# Patient Record
Sex: FEMALE | Race: WHITE
[De-identification: names, ages, dates, MRNs, and addresses within clinical notes are randomized per-mention and may not be internally consistent; named-entity substitution may affect disease eponyms.]

---

## 2018-07-31 ENCOUNTER — HOSPITAL ENCOUNTER (EMERGENCY)
Dept: HOSPITAL 97 - ER | Age: 53
Discharge: HOME | End: 2018-07-31
Payer: COMMERCIAL

## 2018-07-31 VITALS — OXYGEN SATURATION: 97 % | DIASTOLIC BLOOD PRESSURE: 81 MMHG | SYSTOLIC BLOOD PRESSURE: 118 MMHG

## 2018-07-31 VITALS — TEMPERATURE: 97 F

## 2018-07-31 DIAGNOSIS — R06.02: Primary | ICD-10-CM

## 2018-07-31 DIAGNOSIS — F17.210: ICD-10-CM

## 2018-07-31 LAB
ALBUMIN SERPL BCP-MCNC: 3.7 G/DL (ref 3.4–5)
ALP SERPL-CCNC: 95 U/L (ref 45–117)
ALT SERPL W P-5'-P-CCNC: 51 U/L (ref 12–78)
AST SERPL W P-5'-P-CCNC: 24 U/L (ref 15–37)
BUN BLD-MCNC: 20 MG/DL (ref 7–18)
GLUCOSE SERPLBLD-MCNC: 87 MG/DL (ref 74–106)
HCT VFR BLD CALC: 41.8 % (ref 36–45)
INR BLD: 0.85
LYMPHOCYTES # SPEC AUTO: 3.1 K/UL (ref 0.7–4.9)
MAGNESIUM SERPL-MCNC: 2.1 MG/DL (ref 1.8–2.4)
MCH RBC QN AUTO: 29.3 PG (ref 27–35)
MCV RBC: 87.4 FL (ref 80–100)
NT-PROBNP SERPL-MCNC: 55 PG/ML (ref ?–125)
PMV BLD: 8.1 FL (ref 7.6–11.3)
POTASSIUM SERPL-SCNC: 3.6 MMOL/L (ref 3.5–5.1)
RBC # BLD: 4.78 M/UL (ref 3.86–4.86)
UA COMPLETE W REFLEX CULTURE PNL UR: (no result)

## 2018-07-31 PROCEDURE — 36415 COLL VENOUS BLD VENIPUNCTURE: CPT

## 2018-07-31 PROCEDURE — 84484 ASSAY OF TROPONIN QUANT: CPT

## 2018-07-31 PROCEDURE — 83735 ASSAY OF MAGNESIUM: CPT

## 2018-07-31 PROCEDURE — 81025 URINE PREGNANCY TEST: CPT

## 2018-07-31 PROCEDURE — 93005 ELECTROCARDIOGRAM TRACING: CPT

## 2018-07-31 PROCEDURE — 80048 BASIC METABOLIC PNL TOTAL CA: CPT

## 2018-07-31 PROCEDURE — 87086 URINE CULTURE/COLONY COUNT: CPT

## 2018-07-31 PROCEDURE — 85610 PROTHROMBIN TIME: CPT

## 2018-07-31 PROCEDURE — 83880 ASSAY OF NATRIURETIC PEPTIDE: CPT

## 2018-07-31 PROCEDURE — 81003 URINALYSIS AUTO W/O SCOPE: CPT

## 2018-07-31 PROCEDURE — 80076 HEPATIC FUNCTION PANEL: CPT

## 2018-07-31 PROCEDURE — 99284 EMERGENCY DEPT VISIT MOD MDM: CPT

## 2018-07-31 PROCEDURE — 71046 X-RAY EXAM CHEST 2 VIEWS: CPT

## 2018-07-31 PROCEDURE — 81015 MICROSCOPIC EXAM OF URINE: CPT

## 2018-07-31 PROCEDURE — 85025 COMPLETE CBC W/AUTO DIFF WBC: CPT

## 2018-07-31 PROCEDURE — 87088 URINE BACTERIA CULTURE: CPT

## 2018-07-31 NOTE — RAD REPORT
EXAM DESCRIPTION:  RAD - Chest Pa And Lat (2 Views) - 7/31/2018 9:55 pm

 

CLINICAL HISTORY:  Shortness of breath, inhalation injury

 

COMPARISON:  November 2010

 

TECHNIQUE:  PA and lateral views of the chest were obtained.

 

FINDINGS:  The lungs are clear.  Lung markings are mildly prominent as a baseline. Heart size is norm
al and central vasculature is within normal limits.  No pleural effusion or pneumothorax seen.  No ac
[place] bony finding noted.  No aortic abnormality.

 

IMPRESSION:  No acute cardiopulmonary process.  No significant change from comparison.

## 2018-07-31 NOTE — EDPHYS
Physician Documentation                                                                           

 [facility]                                                                

Name: Emma Pizarro                                                                                 

Age: 53 yrs                                                                                       

Sex: Female                                                                                       

: 1965                                                                                   

MRN: [id number]                                                                                   

Arrival Date: 2018                                                                          

Time: 19:05                                                                                       

Account#: [id number]                                                                            

Bed 19                                                                                            

Private MD: Eddie Ferrera T                                                                        

ED Physician Shant Moyer                                                                      

HPI:                                                                                              

                                                                                             

20:05 This 53 yrs old  Female presents to ER via Ambulatory with complaints of Lung  wa  

      Pain.                                                                                       

20:05 The patient has shortness of breath at rest, with light activity, states inhaled strong wa  

      household cleaning agent a week ago and has been experiencing burning in chest with         

      breathing. today feels weak. feels like cannot get enough air. c/o needing the prop         

      head up to sleep x several months as laying flat feels like something sitting on chest.     

      also c/o dyspnea with minimal exertion but denies pain with exertion. denies cough,         

      fever, or dizziness. denies chest pain at the moment. denies leg swelling or pain.          

      Onset: The symptoms/episode began/occurred 1 week(s) ago. Duration: The symptoms are        

      continuous, and are steadily getting worse. The patient's shortness of breath is            

      aggravated by exertion, is alleviated by rest. Associated signs and symptoms: Pertinent     

      positives: Pertinent negatives: non-productive cough, productive cough, diaphoresis,        

      dizziness, fever, vomiting. Severity of symptoms: At their worst the symptoms were          

      moderate in the emergency department the symptoms are unchanged. The patient has not        

      experienced similar symptoms in the past. The patient has not recently seen a               

      physician. see above.                                                                       

                                                                                                  

OB/GYN:                                                                                           

19:23 LMP N/A - Hysterectomy                                                                  aj  

                                                                                                  

Historical:                                                                                       

- Allergies:                                                                                      

19:23 No Known Allergies;                                                                     aj  

- Home Meds:                                                                                      

19:23 Seroquel Oral [Active];                                                                 aj  

- PMHx:                                                                                           

19:23 None;                                                                                   aj  

- PSHx:                                                                                           

19:23 Hysterectomy;                                                                           aj  

                                                                                                  

- Immunization history:: Adult Immunizations up to date.                                          

- Social history:: Smoking status: Patient uses tobacco products, smokes one-half pack            

  cigarettes per day.                                                                             

- Ebola Screening: : Patient negative for fever greater than or equal to 101.5 degrees            

  Fahrenheit, and additional compatible Ebola Virus Disease symptoms Patient denies               

  exposure to infectious person Patient denies travel to an Ebola-affected area in the            

  21 days before illness onset No symptoms or risks identified at this time.                      

                                                                                                  

                                                                                                  

ROS:                                                                                              

20:08 Constitutional: Negative for fever, chills, and weight loss, Eyes: Negative for injury, wa  

      pain, redness, and discharge, ENT: Negative for injury, pain, and discharge, Neck:          

      Negative for injury, pain, and swelling, Abdomen/GI: Negative for abdominal pain,           

      nausea, vomiting, diarrhea, and constipation, Back: Negative for injury and pain, :       

      Negative for injury, bleeding, discharge, and swelling, MS/Extremity: Negative for          

      injury and deformity, Skin: Negative for injury, rash, and discoloration, Neuro:            

      Negative for headache, weakness, numbness, tingling, and seizure, Psych: Negative for       

      depression, anxiety, suicide ideation, homicidal ideation, and hallucinations.              

20:08 Cardiovascular: Positive for orthopnea, paroxysmal nocturnal dyspnea, Negative for          

      chest pain, edema, palpitations.                                                            

20:08 Respiratory: Positive for dyspnea on exertion, shortness of breath, Negative for cough,     

      sputum production, wheezing.                                                                

                                                                                                  

Exam:                                                                                             

20:09 Constitutional:  This is a well developed, well nourished patient who is awake, alert,  wa  

      and in no acute distress. Head/Face:  Normocephalic, atraumatic. Eyes:  Pupils equal        

      round and reactive to light, extra-ocular motions intact.  Lids and lashes normal.          

      Conjunctiva and sclera are non-icteric and not injected.  Cornea within normal limits.      

      Periorbital areas with no swelling, redness, or edema. ENT:  Nares patent. No nasal         

      discharge, no septal abnormalities noted.  Tympanic membranes are normal and external       

      auditory canals are clear.  Oropharynx with no redness, swelling, or masses, exudates,      

      or evidence of obstruction, uvula midline.  Mucous membranes moist. Neck:  Trachea          

      midline, no thyromegaly or masses palpated, and no cervical lymphadenopathy.  Supple,       

      full range of motion without nuchal rigidity, or vertebral point tenderness.  No            

      Meningismus. Chest/axilla:  Normal chest wall appearance and motion.  Nontender with no     

      deformity.  No lesions are appreciated. Cardiovascular:  Regular rate and rhythm with a     

      normal S1 and S2.  No gallops, murmurs, or rubs.  Normal PMI, no JVD.  No pulse             

      deficits. Respiratory:  Lungs have equal breath sounds bilaterally, clear to                

      auscultation and percussion.  No rales, rhonchi or wheezes noted.  No increased work of     

      breathing, no retractions or nasal flaring. Abdomen/GI:  Soft, non-tender, with normal      

      bowel sounds.  No distension or tympany.  No guarding or rebound.  No evidence of           

      tenderness throughout. Back:  No spinal tenderness.  No costovertebral tenderness.          

      Full range of motion. Skin:  Warm, dry with normal turgor.  Normal color with no            

      rashes, no lesions, and no evidence of cellulitis. MS/ Extremity:  Pulses equal, no         

      cyanosis.  Neurovascular intact.  Full, normal range of motion. Neuro:  Awake and           

      alert, GCS 15, oriented to person, place, time, and situation.  Cranial nerves II-XII       

      grossly intact.  Motor strength 5/5 in all extremities.  Sensory grossly intact.            

      Cerebellar exam normal.  Normal gait. Psych:  Awake, alert, with orientation to person,     

      place and time.  Behavior, mood, and affect are within normal limits.                       

                                                                                                  

Vital Signs:                                                                                      

19:23  / 72; Pulse 85; Resp 20; Temp 97.0; Pulse Ox 98% on R/A; Weight 101.6 kg; Height aj  

      5 ft. 1 in. (154.94 cm);                                                                    

20:45  / 68; Pulse 68; Resp 18; Pulse Ox 98% on R/A;                                    tl2 

21:34  / 72; Pulse 70; Resp 18; Pulse Ox 98% on R/A;                                    tl2 

22:57  / 81; Pulse 72; Resp 18; Pulse Ox 97% on R/A;                                    tl2 

19:23 Body Mass Index 42.32 (101.60 kg, 154.94 cm)                                              

                                                                                                  

MDM:                                                                                              

19:35 Patient medically screened.                                                             wa  

20:09 Differential diagnosis: obese, female smoker with symptoms of DEBORAH. will eval to r/o     wa  

      acute cardiopulm etiology. will have f/u with cardiology for r/o ACS if work up             

      negative in ED.                                                                             

23:23 Data reviewed: vital signs, nurses notes, lab test result(s), EKG, radiologic studies.  wa  

      Test interpretation: by ED physician or midlevel provider: EKG: HR 70. nml axis. no         

      acute ischemic abnml noted.                                                                 

23:24 Test interpretation: by ED physician or midlevel provider: labs noted within nml        wa  

      limits. nml CXR.                                                                            

                                                                                                  

                                                                                             

19:45 Order name: Magnesium; Complete Time: 21:18                                             wa  

                                                                                             

19:45 Order name: Basic Metabolic Panel; Complete Time: 21:18                                 wa  

                                                                                             

19:45 Order name: CBC with Diff; Complete Time: 21:18                                         wa  

                                                                                             

19:45 Order name: LFT's; Complete Time: 21:18                                                 wa  

                                                                                             

19:45 Order name: NT PRO-BNP; Complete Time: 21:18                                            wa  

                                                                                             

19:45 Order name: PT-INR; Complete Time: 21:18                                                wa  

                                                                                             

19:45 Order name: Troponin (emerg Dept Use Only); Complete Time: 21:18                        wa  

                                                                                             

19:45 Order name: EKG; Complete Time: 20:15                                                   wa  

                                                                                             

19:45 Order name: Cardiac monitoring; Complete Time: 20:04                                    wa  

                                                                                             

19:45 Order name: Urine Microscopic Only; Complete Time: 21:18                                wa  

                                                                                             

20:43 Order name: Urine Dipstick--Ancillary (enter results); Complete Time: 21:18             [facility] 

                                                                                             

20:43 Order name: Urine Pregnancy--Ancillary (enter results); Complete Time: 21:18            [facility] 

                                                                                             

21:05 Order name: Urine Culture                                                               [facility]

                                                                                             

21:31 Order name: Chest Pa And Lat (2 Views) XRAY; Complete Time: 23:08                       wa  

                                                                                             

19:45 Order name: EKG - Nurse/Tech; Complete Time: 20:40                                      wa  

                                                                                             

19:45 Order name: IV Saline Lock; Complete Time: 20:03                                        wa  

                                                                                             

19:45 Order name: Labs collected and sent; Complete Time: 20:03                               wa  

                                                                                             

19:45 Order name: O2 Per Protocol; Complete Time: 20:03                                       wa  

                                                                                             

19:45 Order name: O2 Sat Monitoring; Complete Time: 20:03                                     wa  

                                                                                             

19:45 Order name: Urine Dipstick-Ancillary (obtain specimen); Complete Time: 20:40            wa  

                                                                                                  

Administered Medications:                                                                         

No medications were administered                                                                  

                                                                                                  

                                                                                                  

Disposition:                                                                                      

18 23:25 Discharged to Home. Impression: shortness of breath.                               

- Condition is Stable.                                                                            

- Discharge Instructions: Shortness of Breath, Easy-to-Read.                                      

- Prescriptions for Albuterol Sulfate 90 mcg/actuation - inhale 1-2 puff by INHALATION            

  route every 4-6 hours; 1 Inhaler.                                                               

- Medication Reconciliation Form, Thank You Letter, Antibiotic Education, Prescription            

  Opioid Use form.                                                                                

- Follow up: Tung Finch MD; When: 1 - 2 days; Reason: Recheck today's complaints.            

- Notes: quit smoking. follow up with the cardiologist for further evaluation and                 

  possible stress testing. retutn to ER immediately for worsening shortness of breath             

  and or if you develop chest pain related to exertion                                            

                                                                                                  

                                                                                                  

Signatures:                                                                                       

Dispatcher [employer]                           EDDiana Conroy RN RN   aj                                                   

Amy Guillen RN                        RN   tl2                                                  

Shant Moyer MD MD wa                                                   

                                                                                                  

Corrections: (The following items were deleted from the chart)                                    

23:40 23:25 2018 23:25 Discharged to Home. Impression: shortness of breath. Condition   tl2 

      is Stable. Forms are Medication Reconciliation Form, Thank You Letter, Antibiotic           

      Education, Prescription Opioid Use. Follow up: Tung Finch; When: 1 - 2 days; Reason:     

      Recheck today's complaints. wa                                                              

                                                                                                  

**************************************************************************************************

## 2018-07-31 NOTE — ER
Nurse's Notes                                                                                     

 [facility]                                                                

Name: Emma Pizarro                                                                                 

Age: 53 yrs                                                                                       

Sex: Female                                                                                       

: 1965                                                                                   

MRN: [id number]                                                                                   

Arrival Date: 2018                                                                          

Time: 19:05                                                                                       

Account#: [id number]                                                                            

Bed 19                                                                                            

Private MD: Eddie Ferrera T                                                                        

Diagnosis: shortness of breath                                                                    

                                                                                                  

Presentation:                                                                                     

                                                                                             

19:22 Presenting complaint: Patient states: Reports SOB and sore throat that started 1 week   aj  

      ago. Patient reports that she is worried she may have inhaled bathroom clearer fumes 1      

      week ago. Presented with 2 drinks to triage with steady gait. Voice is clear.               

      Transition of care: patient was not received from another setting of care. Onset of         

      symptoms was 2018. Risk Assessment: Do you want to hurt yourself or someone        

      else? Patient reports no desire to harm self or others. Initial Sepsis Screen: Does the     

      patient meet any 2 criteria? No. Patient's initial sepsis screen is negative. Does the      

      patient have a suspected source of infection? No. Patient's initial sepsis screen is        

      negative. Care prior to arrival: None.                                                      

19:22 Method Of Arrival: Ambulatory                                                             

19:22 Acuity: REZA 4                                                                           aj  

                                                                                                  

Triage Assessment:                                                                                

19:23 General: Appears in no apparent distress. comfortable, Behavior is calm, cooperative,   aj  

      appropriate for age. Pain: Denies pain. Neuro: Level of Consciousness is awake, alert,      

      obeys commands, Oriented to person, place, time, situation, Appropriate for age.            

      Respiratory: Reports shortness of breath Airway is patent Respiratory effort is even,       

      unlabored, Respiratory pattern is regular, symmetrical. Derm: Skin is intact, is            

      healthy with good turgor, Skin is pink, warm \T\ dry. normal.                               

                                                                                                  

OB/GYN:                                                                                           

19:23 LMP N/A - Hysterectomy                                                                  aj  

                                                                                                  

Historical:                                                                                       

- Allergies:                                                                                      

19:23 No Known Allergies;                                                                     aj  

- Home Meds:                                                                                      

19:23 Seroquel Oral [Active];                                                                 aj  

- PMHx:                                                                                           

19:23 None;                                                                                   aj  

- PSHx:                                                                                           

19:23 Hysterectomy;                                                                           aj  

                                                                                                  

- Immunization history:: Adult Immunizations up to date.                                          

- Social history:: Smoking status: Patient uses tobacco products, smokes one-half pack            

  cigarettes per day.                                                                             

- Ebola Screening: : Patient negative for fever greater than or equal to 101.5 degrees            

  Fahrenheit, and additional compatible Ebola Virus Disease symptoms Patient denies               

  exposure to infectious person Patient denies travel to an Ebola-affected area in the            

  21 days before illness onset No symptoms or risks identified at this time.                      

                                                                                                  

                                                                                                  

Screenin:41 Abuse screen: Denies threats or abuse. Nutritional screening: No deficits noted.        tl2 

      Tuberculosis screening: No symptoms or risk factors identified. Fall Risk None              

      identified.                                                                                 

                                                                                                  

Assessment:                                                                                       

19:30 General: Appears in no apparent distress. comfortable, Behavior is calm, cooperative,   tl2 

      appropriate for age. General: Pt reports cleaning with [employer] 1 week ago and      

      she feels like she may have chemical pneumonia. Denies pain or coughing, states "It         

      just feels weird when I breathe". Pain: Denies pain. Neuro: Level of Consciousness is       

      awake, alert, obeys commands, Oriented to person, place, time, situation.                   

      Cardiovascular: Denies chest pain, Heart tones S1 S2 present. Respiratory: Reports          

      shortness of breath Airway is patent Respiratory effort is even, unlabored, Respiratory     

      pattern is regular, symmetrical, Breath sounds are clear bilaterally. Denies cough,         

      labored breathing. GI: No signs and/or symptoms were reported involving the                 

      gastrointestinal system. : No signs and/or symptoms were reported regarding the           

      genitourinary system. Derm: Skin is pink, warm \T\ dry.                                     

20:30 Reassessment: Patient appears in no apparent distress at this time. Patient and/or      tl2 

      family updated on plan of care and expected duration. Pain level reassessed. Patient is     

      alert, oriented x 3, equal unlabored respirations, skin warm/dry/pink.                      

21:34 Reassessment: Patient appears in no apparent distress at this time. Patient and/or      tl2 

      family updated on plan of care and expected duration. Pain level reassessed. Patient is     

      alert, oriented x 3, equal unlabored respirations, skin warm/dry/pink. awaiting lab         

      results.                                                                                    

22:57 Reassessment: Patient appears in no apparent distress at this time. Patient and/or      tl2 

      family updated on plan of care and expected duration. Pain level reassessed. Patient is     

      alert, oriented x 3, equal unlabored respirations, skin warm/dry/pink. Pending dispo.       

23:38 Reassessment: Patient appears in no apparent distress at this time. Patient and/or      tl2 

      family updated on plan of care and expected duration. Pain level reassessed. Patient is     

      alert, oriented x 3, equal unlabored respirations, skin warm/dry/pink. pt verbalized        

      understanding of discharge instructions, need for follow up and prescription usage.         

                                                                                                  

Vital Signs:                                                                                      

19:23  / 72; Pulse 85; Resp 20; Temp 97.0; Pulse Ox 98% on R/A; Weight 101.6 kg; Height aj  

      5 ft. 1 in. (154.94 cm);                                                                    

20:45  / 68; Pulse 68; Resp 18; Pulse Ox 98% on R/A;                                    tl2 

21:34  / 72; Pulse 70; Resp 18; Pulse Ox 98% on R/A;                                    tl2 

22:57  / 81; Pulse 72; Resp 18; Pulse Ox 97% on R/A;                                    tl2 

19:23 Body Mass Index 42.32 (101.60 kg, 154.94 cm)                                              

                                                                                                  

ED Course:                                                                                        

19:05 Patient arrived in ED.                                                                  as  

19:06 Eddie Ferrera MD is Private Physician.                                                   as  

19:23 Triage completed.                                                                       aj  

19:23 Arm band placed on right wrist. Patient placed in an exam room.                         aj  

19:30 Amy Guillen RN is Primary Nurse.                                                      tl2 

19:35 Shant Moyer MD is Attending Physician.                                             wa  

20:00 Inserted saline lock: 22 gauge in left antecubital area, using aseptic technique. Blood tl2 

      collected.                                                                                  

20:41 Patient has correct armband on for positive identification. Placed in gown. Bed in low  tl2 

      position. Call light in reach. Side rails up X 1. Adult w/ patient.                         

21:47 X-ray completed. Patient tolerated procedure well.                                      ml  

21:48 Chest Pa And Lat (2 Views) XRAY In Process Unspecified.                                 EDMS

23:25 Tung Finch MD is Referral Physician.                                               wa  

23:38 No provider procedures requiring assistance completed. IV discontinued, intact,         tl2 

      bleeding controlled, No redness/swelling at site. Pressure dressing applied.                

                                                                                                  

Administered Medications:                                                                         

No medications were administered                                                                  

                                                                                                  

                                                                                                  

Outcome:                                                                                          

23:25 Discharge ordered by MD.                                                                wa  

23:38 Discharged to home ambulatory, with family.                                             tl2 

23:38 Condition: stable                                                                           

23:38 Discharge instructions given to patient, family, Instructed on discharge instructions,      

      follow up and referral plans. medication usage, Demonstrated understanding of               

      instructions, follow-up care, medications, Prescriptions given X 1.                         

23:40 Patient left the ED.                                                                    tl2 

                                                                                                  

Signatures:                                                                                       

Dispatcher [employer]                           Diana Amaya, DENISSE                       RN   Jenny Miranda Melissa ml Knox, Taylor, RN                        RN   tl2                                                  

Shant Moyer MD MD wa                                                   

                                                                                                  

**************************************************************************************************

## 2018-08-01 NOTE — EKG
Test Date:    2018-07-31               Test Time:    20:33:37

Technician:   KAI                                    

                                                     

MEASUREMENT RESULTS:                                       

Intervals:                                           

Rate:         70                                     

TX:           166                                    

QRSD:         76                                     

QT:           392                                    

QTc:          423                                    

Axis:                                                

P:            59                                     

TX:           166                                    

QRS:          67                                     

T:            17                                     

                                                     

INTERPRETIVE STATEMENTS:                                       

                                                     

Normal sinus rhythm

Normal ECG

Compared to ECG 11/08/2010 16:05:31

no significant change from previous ECG

Electronically Signed On 08-01-18 14:36:08 CDT by Amos Justice

## 2018-10-29 ENCOUNTER — HOSPITAL ENCOUNTER (EMERGENCY)
Dept: HOSPITAL 97 - ER | Age: 53
Discharge: HOME | End: 2018-10-29
Payer: COMMERCIAL

## 2018-10-29 VITALS — OXYGEN SATURATION: 99 % | DIASTOLIC BLOOD PRESSURE: 76 MMHG | SYSTOLIC BLOOD PRESSURE: 132 MMHG

## 2018-10-29 VITALS — TEMPERATURE: 97.8 F

## 2018-10-29 DIAGNOSIS — R07.9: Primary | ICD-10-CM

## 2018-10-29 LAB
BUN BLD-MCNC: 14 MG/DL (ref 7–18)
GLUCOSE SERPLBLD-MCNC: 92 MG/DL (ref 74–106)
HCT VFR BLD CALC: 43.1 % (ref 36–45)
LYMPHOCYTES # SPEC AUTO: 2.5 K/UL (ref 0.7–4.9)
MAGNESIUM SERPL-MCNC: 2.2 MG/DL (ref 1.8–2.4)
MCH RBC QN AUTO: 29.5 PG (ref 27–35)
MCV RBC: 86.8 FL (ref 80–100)
PMV BLD: 8.6 FL (ref 7.6–11.3)
POTASSIUM SERPL-SCNC: 4 MMOL/L (ref 3.5–5.1)
RBC # BLD: 4.96 M/UL (ref 3.86–4.86)
TROPONIN (EMERG DEPT USE ONLY): < 0.02 NG/ML (ref 0–0.04)

## 2018-10-29 PROCEDURE — 99285 EMERGENCY DEPT VISIT HI MDM: CPT

## 2018-10-29 PROCEDURE — 93005 ELECTROCARDIOGRAM TRACING: CPT

## 2018-10-29 PROCEDURE — 71045 X-RAY EXAM CHEST 1 VIEW: CPT

## 2018-10-29 PROCEDURE — 83735 ASSAY OF MAGNESIUM: CPT

## 2018-10-29 PROCEDURE — 84484 ASSAY OF TROPONIN QUANT: CPT

## 2018-10-29 PROCEDURE — 36415 COLL VENOUS BLD VENIPUNCTURE: CPT

## 2018-10-29 PROCEDURE — 80048 BASIC METABOLIC PNL TOTAL CA: CPT

## 2018-10-29 PROCEDURE — 85025 COMPLETE CBC W/AUTO DIFF WBC: CPT

## 2018-10-29 NOTE — EKG
Test Date:    2018-10-29               Test Time:    12:35:27

Technician:   JET                                     

                                                     

MEASUREMENT RESULTS:                                       

Intervals:                                           

Rate:         72                                     

MO:           164                                    

QRSD:         86                                     

QT:           378                                    

QTc:          413                                    

Axis:                                                

P:            53                                     

MO:           164                                    

QRS:          76                                     

T:            35                                     

                                                     

INTERPRETIVE STATEMENTS:                                       

                                                     

Normal sinus rhythm

Normal ECG

Compared to ECG 07/31/2018 20:33:37

No significant changes



Electronically Signed On 10-29-18 15:44:25 CDT by Amos Justice

## 2018-10-29 NOTE — ER
Nurse's Notes                                                                                     

 [facility]                                                                

Name: Emma Pizarro                                                                                 

Age: 53 yrs                                                                                       

Sex: Female                                                                                       

: 1965                                                                                   

MRN: [id number]                                                                                   

Arrival Date: 10/29/2018                                                                          

Time: 12:20                                                                                       

Account#: [id number]                                                                            

Bed 24                                                                                            

Private MD:                                                                                       

Diagnosis: Chest pain, unspecified                                                                

                                                                                                  

Presentation:                                                                                     

10/29                                                                                             

12:27 Presenting complaint: Patient states: " I was getting out of the shower and I picked up ph  

      my granddaughter and I got a sharp pain in the center of my chest." Pt reports that         

      pain is worse w/ inspiration, denies palpitations, N/V. Transition of care: patient was     

      not received from another setting of care. Onset of symptoms was 2018. Risk     

      Assessment: Do you want to hurt yourself or someone else? Patient reports no desire to      

      harm self or others.                                                                        

12:27 Method Of Arrival: Ambulatory                                                           ph  

12:27 Acuity: REZA 3                                                                           ph  

13:35 Initial Sepsis Screen: Does the patient meet any 2 criteria? No. Patient's initial      aj1 

      sepsis screen is negative. Does the patient have a suspected source of infection? No.       

      Patient's initial sepsis screen is negative. Care prior to arrival: None.                   

                                                                                                  

OB/GYN:                                                                                           

12:30 LMP N/A - Hysterectomy                                                                  ph  

                                                                                                  

Historical:                                                                                       

- Allergies:                                                                                      

12:31 No Known Allergies;                                                                     ph  

- Home Meds:                                                                                      

12:31 Seroquel Oral [Active];                                                                 ph  

- PSHx:                                                                                           

12:31 Hysterectomy;                                                                           ph  

                                                                                                  

- Immunization history:: Flu vaccine is not up to date.                                           

- Social history:: Smoking status: Patient uses tobacco products, smokes one-half pack            

  cigarettes per day.                                                                             

- Ebola Screening: : No symptoms or risks identified at this time.                                

                                                                                                  

                                                                                                  

Screenin:45 Abuse screen: Denies threats or abuse. Denies injuries from another. Nutritional        aj1 

      screening: No deficits noted. Tuberculosis screening: No symptoms or risk factors           

      identified.                                                                                 

14:41 Fall Risk None identified.                                                              aj1 

                                                                                                  

Assessment:                                                                                       

12:45 General: Appears in no apparent distress. comfortable, Behavior is calm, cooperative,   aj1 

      appropriate for age. Pain: Complains of pain in mid-sternal area Pain does not radiate.     

      Pain currently is 5 out of 10 on a pain scale. Quality of pain is described as sharp,       

      Is intermittent, Aggravated by deep breathing. Neuro: Level of Consciousness is awake,      

      alert, obeys commands, Oriented to person, place, time, situation, Speech is normal,        

      Facial symmetry appears normal. Cardiovascular: Reports chest pain, Denies nausea,          

      palpitations, vomiting, Heart tones S1 S2 present Patient's skin is warm and dry.           

      Rhythm is sinus rhythm. Respiratory: Airway is patent Respiratory effort is even,           

      unlabored, Respiratory pattern is regular, symmetrical, Breath sounds are clear             

      bilaterally. GI: No signs and/or symptoms were reported involving the gastrointestinal      

      system. : No signs and/or symptoms were reported regarding the genitourinary system.      

      EENT: No signs and/or symptoms were reported regarding the EENT system. Derm: No signs      

      and/or symptoms reported regarding the dermatologic system. Skin is pink, warm \T\ dry.     

      normal. Musculoskeletal: No signs and/or symptoms reported regarding the                    

      musculoskeletal system. Circulation, motion, and sensation intact.                          

13:34 Reassessment: Patient appears in no apparent distress at this time. No changes from     aj1 

      previously documented assessment. Patient and/or family updated on plan of care and         

      expected duration. Pain level reassessed. Patient is alert, oriented x 3, equal             

      unlabored respirations, skin warm/dry/pink.                                                 

14:44 Reassessment: Patient appears in no apparent distress at this time. No changes from     aj1 

      previously documented assessment. Patient and/or family updated on plan of care and         

      expected duration. Pain level reassessed. Patient is alert, oriented x 3, equal             

      unlabored respirations, skin warm/dry/pink.                                                 

                                                                                                  

Vital Signs:                                                                                      

12:30  / 65; Pulse 70; Resp 18; Temp 97.8; Pulse Ox 97% on R/A; Weight 101.6 kg; Height ph  

      5 ft. 1 in. (154.94 cm); Pain 5/10;                                                         

13:34  / 72; Pulse 66; Resp 18; Pulse Ox 100% on R/A;                                   aj1 

14:43  / 76; Pulse 69; Resp 18; Pulse Ox 99% on R/A;                                    aj1 

12:30 Body Mass Index 42.32 (101.60 kg, 154.94 cm)                                            ph  

                                                                                                  

ED Course:                                                                                        

12:20 Patient arrived in ED.                                                                  as  

12:30 Triage completed.                                                                       ph  

12:32 Arm band placed on. EKG completed in triage. Results shown to MD.                       ph  

12:41 Jazmyn Everett RN is Primary Nurse.                                                   aj1 

12:45 Link White MD is Attending Physician.                                                

12:45 No provider procedures requiring assistance completed. Patient maintains SpO2           aj1 

      saturation greater than 95% on room air.                                                    

13:05 Cardiac monitor on. Pulse ox on. NIBP on.                                               jp3 

13:10 Warm blanket given. Pillow given.                                                       jp3 

13:10 Initial lab(s) drawn, by me, sent to lab. EKG done, by EKG tech. reviewed by Link hWite MD. Inserted saline lock: 22 gauge in right forearm, using aseptic technique.         

      Blood collected.                                                                            

13:21 Placed in gown. Bed in low position. Call light in reach. Side rails up X 1.            jp3 

13:22 Basic Metabolic Panel Sent.                                                             jp3 

13:22 CBC with Diff Sent.                                                                     jp3 

13:22 Magnesium Sent.                                                                         jp3 

13:22 Troponin (emerg Dept Use Only) Sent.                                                    jp3 

13:24 X-ray completed. Portable x-ray completed in exam room. Patient tolerated procedure     az  

      well.                                                                                       

13:25 XRAY Chest (1 view) In Process Unspecified.                                             EDMS

14:42 IV discontinued, intact, bleeding controlled, No redness/swelling at site. Pressure     aj1 

      dressing applied.                                                                           

                                                                                                  

Administered Medications:                                                                         

No medications were administered                                                                  

                                                                                                  

                                                                                                  

Outcome:                                                                                          

14:13 Discharge ordered by MD.                                                                  

14:42 Discharged to home ambulatory.                                                          aj1 

14:42 Condition: good                                                                             

14:42 Discharge instructions given to patient, Instructed on discharge instructions, follow       

      up and referral plans. medication usage, Demonstrated understanding of instructions,        

      follow-up care, medications, Prescriptions given X 1.                                       

15:06 Patient left the ED.                                                                    aj1 

                                                                                                  

Signatures:                                                                                       

Dispatcher [employer]                           EDMS                                                 

Jazmyn Everett RN                     RN   aj1                                                  

Jenny Ovalles Patricia, DENISSE                      RN                                                      

Link White MD MD                                                      

Arnaud Campbell                              jp3                                                  

Elaine Tapia                                                   

                                                                                                  

**************************************************************************************************

## 2018-10-29 NOTE — RAD REPORT
EXAM DESCRIPTION:  RAD - Chest Single View - 10/29/2018 1:25 pm

 

CLINICAL HISTORY:  Chest pain

 

COMPARISON:  July 2018

 

TECHNIQUE:  AP portable chest image was obtained 1313 hours .

 

FINDINGS:  Lungs are clear. Heart and vasculature are normal. No measurable pleural effusion and no p
neumothorax. No acute bony abnormality seen. No acute aortic findings suspected.

 

IMPRESSION:  No acute cardiopulmonary process.

 

No significant change from comparison.

## 2018-10-29 NOTE — EDPHYS
Physician Documentation                                                                           

 [facility]                                                                

Name: Emma Pizarro                                                                                 

Age: 53 yrs                                                                                       

Sex: Female                                                                                       

: 1965                                                                                   

MRN: [id number]                                                                                   

Arrival Date: 10/29/2018                                                                          

Time: 12:20                                                                                       

Account#: [id number]                                                                            

Bed 24                                                                                            

Private MD:                                                                                       

ED Physician Link White                                                                       

HPI:                                                                                              

10/29                                                                                             

19:06 This 53 yrs old  Female presents to ER via Ambulatory with complaints of Chest gs  

      Pain, Shortness Of Breath.                                                                  

19:06 The patient or guardian reports chest pain that is located primarily in the anterior    gs  

      chest wall. Onset: acutely, just prior to arrival. The pain does not radiate.               

      Associated signs and symptoms: Pertinent positives: shortness of breath. The chest pain     

      is described as dull. Duration: The patient or guardian reports a single episode, that      

      is now resolved. Modifying factors: The symptoms are alleviated by nothing. the             

      symptoms are aggravated by nothing. Severity of pain: At its worst the pain was             

      moderate in the emergency department the pain has resolved and did so earlier today.        

      The patient has not experienced similar symptoms in the past. The patient has not           

      recently seen a physician. very brief singular episode of chest pain.                       

                                                                                                  

OB/GYN:                                                                                           

12:30 LMP N/A - Hysterectomy                                                                  ph  

                                                                                                  

Historical:                                                                                       

- Allergies:                                                                                      

12:31 No Known Allergies;                                                                     ph  

- Home Meds:                                                                                      

12:31 Seroquel Oral [Active];                                                                 ph  

- PSHx:                                                                                           

12:31 Hysterectomy;                                                                           ph  

                                                                                                  

- Immunization history:: Flu vaccine is not up to date.                                           

- Social history:: Smoking status: Patient uses tobacco products, smokes one-half pack            

  cigarettes per day.                                                                             

- Ebola Screening: : No symptoms or risks identified at this time.                                

                                                                                                  

                                                                                                  

ROS:                                                                                              

19:06 All other systems are negative.                                                         gs  

                                                                                                  

Exam:                                                                                             

19:06 Head/Face:  Normocephalic, atraumatic. Eyes:  Pupils equal round and reactive to light, gs  

      extra-ocular motions intact.  Lids and lashes normal.  Conjunctiva and sclera are           

      non-icteric and not injected.  Cornea within normal limits.  Periorbital areas with no      

      swelling, redness, or edema. ENT:  Nares patent. No nasal discharge, no septal              

      abnormalities noted.  Tympanic membranes are normal and external auditory canals are        

      clear.  Oropharynx with no redness, swelling, or masses, exudates, or evidence of           

      obstruction, uvula midline.  Mucous membranes moist. Neck:  Trachea midline, no             

      thyromegaly or masses palpated, and no cervical lymphadenopathy.  Supple, full range of     

      motion without nuchal rigidity, or vertebral point tenderness.  No Meningismus.             

      Chest/axilla:  Normal chest wall appearance and motion.  Nontender with no deformity.       

      No lesions are appreciated. Cardiovascular:  Regular rate and rhythm with a normal S1       

      and S2.  No gallops, murmurs, or rubs.  Normal PMI, no JVD.  No pulse deficits.             

      Respiratory:  Lungs have equal breath sounds bilaterally, clear to auscultation and         

      percussion.  No rales, rhonchi or wheezes noted.  No increased work of breathing, no        

      retractions or nasal flaring. Abdomen/GI:  Soft, non-tender, with normal bowel sounds.      

      No distension or tympany.  No guarding or rebound.  No evidence of tenderness               

      throughout. Back:  No spinal tenderness.  No costovertebral tenderness.  Full range of      

      motion. Skin:  Warm, dry with normal turgor.  Normal color with no rashes, no lesions,      

      and no evidence of cellulitis. MS/ Extremity:  Pulses equal, no cyanosis.                   

      Neurovascular intact.  Full, normal range of motion. Neuro:  Awake and alert, GCS 15,       

      oriented to person, place, time, and situation.  Cranial nerves II-XII grossly intact.      

      Motor strength 5/5 in all extremities.  Sensory grossly intact.  Cerebellar exam            

      normal.  Normal gait.                                                                       

19:06 Constitutional: The patient appears alert, awake.                                           

19:06 ECG was reviewed by the Attending Physician.                                                

                                                                                                  

Vital Signs:                                                                                      

12:30  / 65; Pulse 70; Resp 18; Temp 97.8; Pulse Ox 97% on R/A; Weight 101.6 kg; Height ph  

      5 ft. 1 in. (154.94 cm); Pain 5/10;                                                         

13:34  / 72; Pulse 66; Resp 18; Pulse Ox 100% on R/A;                                   aj1 

14:43  / 76; Pulse 69; Resp 18; Pulse Ox 99% on R/A;                                    aj1 

12:30 Body Mass Index 42.32 (101.60 kg, 154.94 cm)                                            ph  

                                                                                                  

MDM:                                                                                              

12:59 Patient medically screened.                                                               

14:11 Data reviewed: vital signs, lab test result(s), EKG, radiologic studies. Counseling: I  gs  

      had a detailed discussion with the patient and/or guardian regarding: the historical        

      points, exam findings, and any diagnostic results supporting the discharge/admit            

      diagnosis, lab results, radiology results, the need for outpatient follow up.               

19:06 Differential diagnosis: coronary artery disease chest wall pain, pneumonia,             gs  

      pneumothorax. Response to treatment: the patient's symptoms have resolved after             

      treatment, the patient's pain is gone.                                                      

                                                                                                  

10/29                                                                                             

12:47 Order name: Basic Metabolic Panel; Complete Time: 13:47                                 gs  

10/29                                                                                             

12:47 Order name: CBC with Diff; Complete Time: 13:47                                           

10/29                                                                                             

12:47 Order name: Magnesium; Complete Time: 13:47                                             gs  

10/29                                                                                             

12:47 Order name: Troponin (emerg Dept Use Only); Complete Time: 13:47                        gs  

10/29                                                                                             

12:47 Order name: XRAY Chest (1 view); Complete Time: 14:11                                     

10/29                                                                                             

12:47 Order name: EKG; Complete Time: 12:47                                                     

10/29                                                                                             

12:47 Order name: Cardiac monitoring; Complete Time: 12:48                                      

10/29                                                                                             

12:47 Order name: EKG - Nurse/Tech; Complete Time: 12:48                                        

10/29                                                                                             

12:47 Order name: IV Saline Lock; Complete Time: 13:22                                        gs  

10/29                                                                                             

12:47 Order name: Labs collected and sent; Complete Time: 13:22                                 

10/29                                                                                             

12:47 Order name: O2 Per Protocol; Complete Time: 12:48                                         

10/29                                                                                             

12:47 Order name: O2 Sat Monitoring; Complete Time: 12:48                                     gs  

                                                                                                  

EC:06 Rate is 72 beats/min. Rhythm is regular. RI interval is normal. QRS interval is normal. gs  

      T waves are Normal. No ST changes noted. Clinical impression: Normal ECG. Interpreted       

      by me.                                                                                      

                                                                                                  

Administered Medications:                                                                         

No medications were administered                                                                  

                                                                                                  

                                                                                                  

Disposition:                                                                                      

10/29/18 14:13 Discharged to Home. Impression: Chest pain, unspecified.                           

- Condition is Stable.                                                                            

- Discharge Instructions: Nonspecific Chest Pain.                                                 

- Prescriptions for Naprosyn 500 mg Oral Tablet - take 1 tablet by ORAL route 2 times             

  per day As needed take with food; 30 tablet.                                                    

- Medication Reconciliation Form, Thank You Letter, Antibiotic Education, Prescription            

  Opioid Use form.                                                                                

- Follow up: Private Physician; When: 2 - 3 days; Reason: Re-evaluation by your                   

  physician.                                                                                      

                                                                                                  

                                                                                                  

                                                                                                  

Signatures:                                                                                       

Dispatcher [facility] Clinic Avon HospitalComputeNorthern Navajo Medical CenterJazmyn Ibrahim RN                     RN   aj1                                                  

Singh, Anna, RN                      RN   ph                                                   

[facility] MD MD gs                                                   

                                                                                                  

Corrections: (The following items were deleted from the chart)                                    

15:06 14:13 10/29/2018 14:13 Discharged to Home. Impression: Chest pain, unspecified.         aj1 

      Condition is Stable. Forms are Medication Reconciliation Form, Thank You Letter,            

      Antibiotic Education, Prescription Opioid Use. Follow up: Private Physician; When: 2 -      

      3 days; Reason: Re-evaluation by your physician. gs                                         

                                                                                                  

**************************************************************************************************

## 2022-11-24 ENCOUNTER — HOSPITAL ENCOUNTER (EMERGENCY)
Dept: HOSPITAL 97 - ER | Age: 57
Discharge: HOME | End: 2022-11-24
Payer: COMMERCIAL

## 2022-11-24 VITALS — TEMPERATURE: 98.6 F | DIASTOLIC BLOOD PRESSURE: 76 MMHG | SYSTOLIC BLOOD PRESSURE: 155 MMHG | OXYGEN SATURATION: 100 %

## 2022-11-24 DIAGNOSIS — K08.89: Primary | ICD-10-CM

## 2022-11-24 DIAGNOSIS — F17.210: ICD-10-CM

## 2022-11-24 PROCEDURE — 96372 THER/PROPH/DIAG INJ SC/IM: CPT

## 2022-11-24 PROCEDURE — 99283 EMERGENCY DEPT VISIT LOW MDM: CPT

## 2022-11-24 NOTE — EDPHYS
Physician Documentation                                                                           

 [facility]                                                                 

Name: Emma Pizarro                                                                                 

Age: 57 yrs                                                                                       

Sex: Female                                                                                       

: 1965                                                                                   

MRN: [id number]                                                                                   

Arrival Date: 2022                                                                          

Time: 20:12                                                                                       

Account#: [id number]                                                                            

Bed 10                                                                                            

Private MD:                                                                                       

ED Physician Pj Hudson                                                                         

HPI:                                                                                              

                                                                                             

20:24 This 57 yrs old  Female presents to ER via [facility] with complaints of Dental pm1 

      pain.                                                                                       

20:24 Onset: The symptoms/episode began/occurred 2 week(s) ago.                               pm1 

20:24 The patient presents with pain. The problem is located in the upper left second molar.  pm1 

      Duration: The symptoms are continuous. Modifying factors: The symptoms are alleviated       

      by over the counter medications, NSAIDs, but has not been helping for the past three        

      days. Associated signs and symptoms: Pertinent negatives: fever, inability to eat.          

      Severity of symptoms: in the emergency department the symptoms are unchanged. The           

      patient has not experienced similar symptoms in the past. The patient has not recently      

      seen a physician, has an appointment scheduled, on Monday with her dentist. Patient has     

      been taking ibuprofen for the past 2 weeks to manage her dental pain. However for the       

      past 3 days her pain has not been resolved with the ibuprofen. Contacted her dentist he     

      wrote in a prescription for penicillin. Patient has an appointment with her dentist on      

      Monday for evaluation. Patient is here primarily for pain management, since the             

      ibuprofen is no longer helping. Patient has been taking some leftover Tylenol four with     

      some relief. Patient reports she has 3 pills of that medication left.                       

                                                                                                  

Historical:                                                                                       

- Allergies:                                                                                      

20:29 No Known Allergies;                                                                     tw5 

- Home Meds:                                                                                      

20:29 Seroquel Oral [Active];                                                                 tw5 

- PMHx:                                                                                           

20:29 None;                                                                                   tw5 

- PSHx:                                                                                           

20:29 None;                                                                                   tw5 

                                                                                                  

- Immunization history:: Flu vaccine is not up to date.                                           

- Social history:: Smoking status: Patient reports the use of cigarette tobacco                   

  products, smokes one-half pack cigarettes per day.                                              

                                                                                                  

                                                                                                  

ROS:                                                                                              

20:29 Constitutional: Negative for fever, chills, and weight loss, Eyes: Negative for injury, pm1 

      pain, redness, and discharge.                                                               

20:29 Neck: Negative for injury, pain, and swelling, Cardiovascular: Negative for chest pain,     

      palpitations, and edema, Respiratory: Negative for shortness of breath, cough,              

      wheezing, and pleuritic chest pain, MS/Extremity: Negative for injury and deformity,        

      Skin: Negative for injury, rash, and discoloration, Neuro: Negative for headache,           

      weakness, numbness, tingling, and seizure.                                                  

20:29 ENT: Positive for dental pain, Negative for difficulty swallowing, difficulty handling      

      secretions, hoarseness.                                                                     

20:29 All other systems are negative.                                                             

                                                                                                  

Exam:                                                                                             

20:29 Constitutional:  This is a well developed, well nourished patient who is awake, alert,  pm1 

      and in no acute distress. Head/Face:  Normocephalic, atraumatic.                            

20:29 Skin:  Warm, dry with normal turgor.  Normal color with no rashes, no lesions, and no       

      evidence of cellulitis. MS/ Extremity:  Pulses equal, no cyanosis.  Neurovascular           

      intact.  Full, normal range of motion.                                                      

20:29 Eyes: Exam is negative for acute changes, Periorbital structures: no acute changes,         

      Extraocular movements: no acute changes, Conjunctiva: no acute changes, no injection.       

20:29 ENT: Mouth: no acute changes, Lips: normal, moist, Oral mucosa: normal, pink and            

      intact, moist.                                                                              

20:29 Cardiovascular: Exam negative for  acute changes, Rate: normal, Rhythm: regular,            

      Pulses: no pulse deficits are appreciated.                                                  

20:29 Respiratory: Exam negative for  acute changes, respiratory distress, shortness of           

      breath.                                                                                     

20:29 Neuro: Exam negative for acute changes, Orientation: is normal, Mentation: is normal,       

      Motor: is normal, moves all fours.                                                          

                                                                                                  

Vital Signs:                                                                                      

20:25  / 76; Pulse 85; Resp 18; Temp 98.6; Pulse Ox 100% on R/A; Weight 92.99 kg;       tw5 

      Height 5 ft. 1 in. (154.94 cm); Pain 9/10;                                                  

20:25 Body Mass Index 38.73 (92.99 kg, 154.94 cm)                                             tw5 

                                                                                                  

MDM:                                                                                              

20:15 Patient medically screened.                                                             pm1 

20:23 Counseling: I had a detailed discussion with the patient and/or guardian regarding: the pm1 

      historical points, exam findings, and any diagnostic results supporting the                 

      discharge/admit diagnosis, the need for outpatient follow up, for definitive care, a        

      dentist, to return to the emergency department if symptoms worsen or persist or if          

      there are any questions or concerns that arise at home.                                     

20:30 Data reviewed: vital signs. Data interpreted: Pulse oximetry: on room air is 100 %.     pm1 

      Interpretation: normal.                                                                     

20:30 ED course: [place] reviewed.                                                           pm1 

                                                                                                  

Administered Medications:                                                                         

20:39 Drug: morphine 4 mg Route: IM; Site: right gluteus;                                     tp1 

20:40 Follow up: Response: Medication administered at discharge.                              tp1 

20:39 Drug: Zofran (Ondansetron) 4 mg Route: PO;                                              tp1 

20:40 Follow up: Response: Medication administered at discharge.                              tp1 

                                                                                                  

                                                                                                  

Disposition:                                                                                      

22:44 Co-signature as Attending Physician, Pj Hudson MD.                                    rn  

                                                                                                  

Disposition Summary:                                                                              

22 20:24                                                                                    

Discharge Ordered                                                                                 

      Location: Home                                                                          pm1 

      Problem: new                                                                            pm1 

      Symptoms: have improved                                                                 pm1 

      Condition: Stable                                                                       pm1 

      Diagnosis                                                                                   

        - Dental Pain                                                                         pm1 

      Followup:                                                                               pm1 

        - With: Emergency Department                                                               

        - When: As needed                                                                          

        - Reason: Worsening of condition                                                           

      Followup:                                                                               pm1 

        - With: Private Physician                                                                  

        - When: 2 - 3 days                                                                         

        - Reason: Recheck today's complaints, Continuance of care, Re-evaluation by your           

      physician                                                                                   

      Discharge Instructions:                                                                     

        - Discharge Summary Sheet                                                             pm1 

        - Dental Pain                                                                         pm1 

      Forms:                                                                                      

        - Medication Reconciliation Form                                                      pm1 

        - Thank You Letter                                                                    pm1 

        - Antibiotic Education                                                                pm1 

        - Prescription Opioid Use                                                             pm1 

      Prescriptions:                                                                              

        - Tramadol 50 mg Oral Tablet                                                               

            - take 1 tablet by ORAL route every 8 hours as needed; 12 tablet; Refills: 0,     pm1 

      Product Selection Permitted                                                                 

Signatures:                                                                                       

Pj Hudson MD MD   rn                                                   

Alban Garces NP                    NP   pm1                                                  

Ayesha Ivory                                tw5                                                  

Ayesha Thornton, RN                     RN   tp1                                                  

                                                                                                  

**************************************************************************************************

## 2022-11-24 NOTE — XMS REPORT
Continuity of Care Document

                          Created on:2022



Patient:VERÓNICA CENTENO

Sex:Female

:1965

External Reference #:[id number]





Demographics







                          Address                   [address] [id number]

 

                          Home Phone                [phone]

 

                          Work Phone                [phone]

 

                          Mobile Phone              [phone]

 

                          Email Address             [email or web address]

 

                          Preferred Language        English

 

                          Marital Status            Unknown

 

                          Yazidi Affiliation     Unknown

 

                          Race                      Unknown

 

                          Additional Race(s)        Unavailable

 

                          Ethnic Group              Unknown









Author







                          Organization              [facility]

t

 

                          Address                   [address]



                                                    [place] [address]

 

                          Phone                     [phone]









Support







                Name            Relationship    Address         Phone

 

                SADI BUTLERVALERIE BONILLA               [address] [phone]



                                                [place] [id number] 

 

                Zuly Chung Spouse          [address]    [phone]

[phone]



                                                [place] [address] 

 

                ZULY CENTENO X               [address] [facility]    Unavailable



                                                [place] [phone] 

 

                ZULY CENTENO   Unavailable     [address] [phone]



                                                [place] [address] 

 

                GARRETT ESCALERA Unavailable     [address]     [phone]



                                                [place] [phone] 

 

                NADER CENTENO     Unavailable     [address] [phone]



                                                [place] [phone] 

 

                LORRAINE ESCALERA Unavailable     [address]     [phone]



                                                [place] [address] 









Care Team Providers







                    Name                Role                Phone

 

                    KIMANI SANDOVAL Primary Care Physician Unavailable

 

                    WANDA DAN    Attending Clinician Unavailable

 

                    RADIOLOGY           Attending Clinician Unavailable

 

                    Wanda Dan MD Attending Clinician [phone]

 

                    TITI REILLY     Attending Clinician Unavailable

 

                    NAYELI CABRERA      Attending Clinician Unavailable

 

                    WANDA DAN    Admitting Clinician Unavailable









Payers







           Payer Name Policy Type Policy Number Effective Date Expiration Date S

gee

 

           [facility] -            [id number] 2008-10-01 00:00:00          

  



           OUT OF STATE                                             







Problems







       Condition Condition Condition Status Onset  Resolution Last   Treating Co

mments 

Source



       Name   Details Category        Date   Date   Treatment Clinician        



                                                 Date                 

 

       Heartburn Heartburn Disease Active 2020                      Overview: 

Univers



                                   1-17                        Formattin ity of



                                   00:00:                      g of this [place]



                                   00                          note   Medical



                                                               might be Branch



                                                               different 



                                                               from the 



                                                               original. 



                                                               Added  



                                                               automatic 



                                                               ally from 



                                                               request 



                                                               for    



                                                               surgery 



                                                               [id number] 

 

       Diverticul Diverticul Disease Active                              U

nivers



       itis   itis                 15                               ity of



                                   00:00:                             [place]



                                   00                                 Medical



                                                                      Branch

 

       Morbid Morbid Disease Active                              Univers



       obesity obesity               915                               ity of



       with body with body               00:00:                             [place]

[place]



       mass index mass index               00                                 Me

dical



       of     of                                                      Branch



       40.0-49.9 40.0-49.9                                                  







Allergies, Adverse Reactions, Alerts







       Allergy Allergy Status Severity Reaction(s) Onset  Inactive Treating Comm

ents 

Source



       Name   Type                        Date   Date   Clinician        

 

       No Known DA     Active U             2018                      [facility]



       Allergie                             2-14                        [place]



       s                                  00:00:                      [facility]



                                          00                          dic



                                                                      [facility]osp[facility]



                                                                      l

 

       No Known DA     Active U             2018                      [facility]



       Allergie                                                     [place]



       s                                  00:00:                      [facility]



                                          00                          dic



                                                                      Hospita



                                                                      l

 

       No Known DA     Active U             2018                      [facility]



       Allergie                                                     [place]



       s                                  00:00:                      [facility]



                                          00                          dic



                                                                      Hospita



                                                                      l

 

       No Known DA     Active U                                   [facility]



       Allergie                                                     [place]



       s                                  00:00:                      [facility]



                                          00                          dic



                                                                      Hospita



                                                                      l

 

       NO KNOWN Drug   Active                                           Univers



       ALLERGIE Class                                                   ity of



       S                                                              [facility]







Social History







           Social Habit Start Date Stop Date  Quantity   Comments   Source

 

           History [facility]                                             University o

f



           Alcohol Std Drinks                                             [place] 

Medical



                                                                  Branch

 

           History [facility] o

f



           Alcohol Binge                                             [place] [facility]

al



                                                                  Branch

 

           History [facility] o

f



           Alcohol Comment                                             [place] [facility]

ical



                                                                  Branch

 

           Exposure to                       Yes                   University of



           SARS-CoV-2 (event)                                             [facility]



                                                                  Branch

 

           History of tobacco                       Cigarette Smoker            

University of



           use                                                    [facility]



                                                                  Branch

 

           Alcohol intake 2021 Lifetime              University

 of



                      00:00:00   00:00:00   non-drinker            [place] [facility]



                                            (finding)             Branch

 

           History SDOH 2019-11-15 2019-11-15 1                     University o

f



           Alcohol [facility]00[facility]00   [facility]00[facility]00                         [facility]



                                                                  [facility]

 

           Cigarettes smoked 2019-09-15 2019-09-15                       Univers

ity of



           current (pack per 00:00:00   00:00:00                         [facility]



           day) - Reported                                             Branch

 

           Tobacco use and 2019-09-15 2019-09-15 Never used            Universit

y of



           exposure   00:00:00   00:00:00                         [facility]

 

           Sex Assigned At 1965                       Universit

y of



           Birth      00:00:00   00:00:00                         [facility]









                Smoking Status  Start Date      Stop Date       Source

 

                Current every day smoker 2019-09-15 00:00:00                 [facility]







Medications







       Ordered Filled Start  Stop   Current Ordering Indication Dosage Frequency

 Signature

                    Comments            Components          Source



     Medication Medication Date Date Medication? Clinician                (SIG) 

          



     Name Name                                                   

 

     PANTOPRAZOL            Yes       [id number]           TAKE 1           

Univers



     E 20 mg EC      9-07                               TABLET BY           ity 

of



     tablet      00:00:                               MOUTH           [place]



               00                                 EVERY DAY           Medical



                                                                 Branch

 

     PANTOPRAZOL            Yes       [id number]           TAKE 1           

Univers



     E 20 mg EC      9-07                               TABLET BY           ity 

of



     tablet      00:00:                               MOUTH           [place]



               00                                 EVERY DAY           Medical



                                                                 Branch

 

     escitalopra      2020      Yes            10mg      Take 10 mg           

Univers



     m oxalate      2-07                               by mouth           ity of



     10 mg      00:00:                               every           [place]



     tablet      00                                 morning.           Medical



                                                                 Branch

 

     terbinafine      2020      Yes            250mg      Take 250           U

nivers



      mg      2-07                               mg by           ity of



     tablet      00:00:                               mouth           [place]



               00                                 daily.           Medical



                                                                 Branch

 

     escitalopra      2020      Yes            10mg      Take 10 mg           

Univers



     m oxalate      2-07                               by mouth           ity of



     10 mg      00:00:                               every           [place]



     tablet      00                                 morning.           Medical



                                                                 Branch

 

     terbinafine      2020      Yes            250mg      Take 250           U

nivers



      mg      2-07                               mg by           ity of



     tablet      00:00:                               mouth           [place]



               00                                 daily.           Medical



                                                                 Branch

 

     QUEtiapine            Yes            6.25mg      Take 6.25           

Univers



     25 mg      9-17                               mg by           ity of



     tablet      00:00:                               mouth           [place]



               00                                 daily.           Medical



                                                                 Branch

 

     QUEtiapine            Yes            6.25mg      Take 6.25           

Univers



     25 mg      9-17                               mg by           ity of



     tablet      00:00:                               mouth           [place]



               00                                 daily.           Medical



                                                                 Branch







Vital Signs







             Vital Name   Observation Time Observation Value Comments     Source

 

             Systolic blood 2021 20:42:00 116 mm[Hg]                Univer

sity of



             pressure                                            [facility]

 

             Diastolic blood 2021 20:42:00 68 mm[Hg]                 [facility]

 

             [facility]   2021 20:42:00 85 /min                   [facility]

[facility]

 

             Body temperature 2021 20:42:00 36.28 Aleida                 [facility]

[facility]

 

             Respiratory rate 2021 20:42:00 16 /min                   [facility]

 

             Body height  2021 20:42:00 154.9 cm                  [facility]

 

             Body weight  2021 20:42:00 99.61 kg                  [facility]

 

             BMI          2021 20:42:00 41.49 kg/m2               [facility]

 

             Oxygen saturation in 2021 20:42:00 96 /min                   

[facility]



             Arterial blood by                                        [place] [facility]

walt



             Pulse oximetry                                        Branch







Procedures







                Procedure       Date / Time Performed Performing Clinician Sour

e

 

                US ABDOMEN LIMITED 2022-01-10 19:17:30 Jozef Marcelo

[facility]

 

                CONSENT/REFUSAL FOR 2022-01-10 18:35:32 Doctor Unassigned, No [facility]



                DIAGNOSIS AND                   Name            [facility]



                TREATMENT                                       

 

                ASSIGNMENT OF BENEFITS 2022-01-10 18:35:18 Doctor Unassigned, No

 [facility]







Encounters







        Start   End     Encounter Admission Attending Care    Care    Encounter 

Source



        Date/Time Date/Time Type    Type    Clinicians Facility Department ID   

   

 

        2021-10-30         Emergency                 [facility]    [id number] 

Univers



        14:29:53                                                         [facility]

 

        2021-10-30         Outpatient R       [facility]    [facility]     [id number]

[id number] Univers



        06:29:11                         WANDA proctor [facility]

 

        2022 Outpatient R       RADIOLOGY [facility]    [id number]

[id number] Univers



        00:00:00 00:00:00                                                 [facility]

 

        2022-01-10 2022-01-10 Outpatient R       [facility]    [id number]

[id number] Univers



        12:35:22 23:59:00                 WANDA proctor [facility]

 

        2022-01-10 2022-01-10 [facility]    [id number]

[id number] Univers



        12:35:22 23:59:00 Encounter         Wanda WATSON [id number]        

 [facility] [id number]         [facility]  [phone]         Medi

walt



                                                        806             Branch

 

        2021 Outpatient R       [facility]    [id number]

[id number] Univers



        14:45:00 15:17:57                 WANDA                          [facility]

 

        20[facility]    [id number]

[id number] Univers



        14:45:00 15:17:57 Visit           Wanda WATSON [id number]         i

ty of



                                                [place] [id number]         [place]

[place]



                                                [place] [phone]         Me

dical



                                                [address]                 

 

        2021 [facility]ill          [facility]    [id number]

[id number] Univers



        00:00:00 00:00:00                 Wanda Watson [id number]         i

ty of



                                                Home [id number]         [place]

[place]



                                                [place] [phone]         Me

dic[address]                 

 

        2021 Outpatient R       TOMMIE [facility]    [id number]

[id number] Univers



        13:00:00 13:00:00                 [facility]

 

        2021-06-15 2021-06-15 Outpatient R       [facility]    [id number]

[id number] Univers



        15:30:00 15:30:00                 WANDA                          [facility]

 

        2021-01-15 2021-01-15 Outpatient R       RADIOLOGY [facility]    [id number]

[id number] Univers



        00:00:00 00:00:00                                                 [facility]

 

        2020 Outpatient R       [facility]    [id number]

[id number] Univers



        09:30:00 09:30:00                 WANDA                          [facility]

 

        2020 Outpatient R       [facility]    [id number]

[id number] Univers



        10:30:00 10:30:00                 WANDA                          [facility]

 

        2020 Outpatient R       [facility]    [id number]

[id number] Univers



        13:45:00 13:45:00                 WANDA                          [facility]

 

        2020 Outpatient R       [facility]    [id number]

[id number] Univers



        09:30:00 09:30:00                 WANDA                          [facility]

 

        2020 Outpatient R       DEBORAH  [facility]    [id number]

[id number] Univers



        10:40:00 10:40:00                 NAYELI proctor [facility]







Results

This patient has no known results.

## 2022-11-24 NOTE — ER
Nurse's Notes                                                                                     

 [facility]                                                                 

Name: Emma Pizarro                                                                                 

Age: 57 yrs                                                                                       

Sex: Female                                                                                       

: 1965                                                                                   

MRN: [id number]                                                                                   

Arrival Date: 2022                                                                          

Time: 20:12                                                                                       

Account#: [id number]                                                                            

Bed 10                                                                                            

Private MD:                                                                                       

Diagnosis: Dental Pain                                                                            

                                                                                                  

Presentation:                                                                                     

                                                                                             

20:25 Chief complaint: Patient states: "I got a crown worked on and now my jaw is killing me. tw5 

      I wont be able to get in to see my dentist until Monday.". Coronavirus screen: Vaccine      

      status: Patient reports being unvaccinated. Ebola Screen: Patient negative for fever        

      greater than or equal to 101.5 degrees Fahrenheit, and additional compatible Ebola          

      Virus Disease symptoms Patient denies exposure to infectious person. Patient denies         

      travel to an Ebola-affected area in the 21 days before illness onset. Initial Sepsis        

      Screen: Does the patient meet any 2 criteria? No. Patient's initial sepsis screen is        

      negative. Does the patient have a suspected source of infection? No. Patient's initial      

      sepsis screen is negative. Risk Assessment: Do you want to hurt yourself or someone         

      else? Patient reports no desire to harm self or others. Onset of symptoms is unknown.       

20:25 Method Of Arrival: Ambulatory                                                           tw5 

20:25 Acuity: REZA 5                                                                           tw5 

                                                                                                  

Triage Assessment:                                                                                

20:29 General: Appears in no apparent distress. Behavior is calm, cooperative, appropriate    tw5 

      for age. Pain: Pain currently is 9 out of 10 on a pain scale.                               

                                                                                                  

Historical:                                                                                       

- Allergies:                                                                                      

20:29 No Known Allergies;                                                                     tw5 

- Home Meds:                                                                                      

20:29 Seroquel Oral [Active];                                                                 tw5 

- PMHx:                                                                                           

20:29 None;                                                                                   tw5 

- PSHx:                                                                                           

20:29 None;                                                                                   tw5 

                                                                                                  

- Immunization history:: Flu vaccine is not up to date.                                           

- Social history:: Smoking status: Patient reports the use of cigarette tobacco                   

  products, smokes one-half pack cigarettes per day.                                              

                                                                                                  

                                                                                                  

Screenin:40 Abuse screen: Denies threats or abuse. Denies injuries from another. Nutritional        tp1 

      screening: No deficits noted. Tuberculosis screening: No symptoms or risk factors           

      identified. Fall Risk None identified.                                                      

                                                                                                  

Assessment:                                                                                       

20:39 General: Appears in no apparent distress. comfortable, Behavior is calm, cooperative.   tp1 

      Neuro: Level of Consciousness is awake, alert, obeys commands, Oriented to person,          

      place, time, situation. Cardiovascular: Patient's skin is warm and dry. Respiratory:        

      Airway is patent Respiratory effort is even, unlabored. Derm: Skin is pink, warm \T\ dry.   

      Musculoskeletal: Circulation, motion, and sensation intact.                                 

                                                                                                  

Vital Signs:                                                                                      

20:25  / 76; Pulse 85; Resp 18; Temp 98.6; Pulse Ox 100% on R/A; Weight 92.99 kg;       tw5 

      Height 5 ft. 1 in. (154.94 cm); Pain 9/10;                                                  

20:25 Body Mass Index 38.73 (92.99 kg, 154.94 cm)                                             tw5 

                                                                                                  

ED Course:                                                                                        

20:12 Patient arrived in ED.                                                                  ja2 

20[facility]15 [facility]                                                           pm[facility] 

20:15 Pj Hudson MD is Attending Physician.                                                pm1 

20:28 Triage completed.                                                                       tw5 

20:29 Arm band placed on.                                                                     tw5 

20:40 Patient has correct armband on for positive identification. Bed in low position. Call   tp1 

      light in reach.                                                                             

20:40 No provider procedures requiring assistance completed. Patient did not have IV access   tp1 

      during this emergency room visit.                                                           

                                                                                                  

Administered Medications:                                                                         

20:39 Drug: morphine 4 mg Route: IM; Site: right gluteus;                                     tp1 

20:40 Follow up: Response: Medication administered at discharge.                              tp1 

20:39 Drug: Zofran (Ondansetron) 4 mg Route: PO;                                              tp1 

20:40 Follow up: Response: Medication administered at discharge.                              tp1 

                                                                                                  

                                                                                                  

Medication:                                                                                       

20:40 VIS not applicable for this client.                                                     tp1 

                                                                                                  

Outcome:                                                                                          

20:24 Discharge ordered by MD.                                                                pm1 

20:41 Discharged to home ambulatory, with family.                                             tp1 

20:41 Condition: good                                                                             

20:41 Discharge instructions given to patient, Instructed on discharge instructions, follow       

      up and referral plans. medication usage, Demonstrated understanding of instructions,        

      follow-up care, medications, Prescriptions given X 1.                                       

20:41 Patient left the ED.                                                                    tp1 

                                                                                                  

Signatures:                                                                                       

Alban Garces NP                    NP   pm1                                                  

Ada Dominguez Tiffany                                tw5                                                  

Ayesha Thornton RN                     RN   tp1                                                  

                                                                                                  

**************************************************************************************************

## 2022-11-25 ENCOUNTER — HOSPITAL ENCOUNTER (EMERGENCY)
Dept: HOSPITAL 97 - ER | Age: 57
Discharge: HOME | End: 2022-11-25
Payer: COMMERCIAL

## 2022-11-25 VITALS — SYSTOLIC BLOOD PRESSURE: 150 MMHG | DIASTOLIC BLOOD PRESSURE: 78 MMHG | OXYGEN SATURATION: 100 % | TEMPERATURE: 97.5 F

## 2022-11-25 DIAGNOSIS — F17.210: ICD-10-CM

## 2022-11-25 DIAGNOSIS — K08.89: Primary | ICD-10-CM

## 2022-11-25 PROCEDURE — 99283 EMERGENCY DEPT VISIT LOW MDM: CPT

## 2022-11-25 PROCEDURE — 96372 THER/PROPH/DIAG INJ SC/IM: CPT

## 2022-11-25 NOTE — XMS REPORT
Continuity of Care Document

                          Created on:2022



Patient:VERÓNICA CENTENO

Sex:Female

:1965

External Reference #:[id number]





Demographics







                          Address                   [address] [id number]

 

                          Home Phone                [phone]

 

                          Work Phone                [phone]

 

                          Mobile Phone              [phone]

 

                          Email Address             [email or web address]

 

                          Preferred Language        English

 

                          Marital Status            Unknown

 

                          Nondenominational Affiliation     Unknown

 

                          Race                      Unknown

 

                          Additional Race(s)        Unavailable

 

                          Ethnic Group              Unknown









Author







                          Organization              [facility]

t

 

                          Address                   [address]



                                                    [place] [address]

 

                          Phone                     [phone]









Support







                Name            Relationship    Address         Phone

 

                GARRETT BUTLERE CHAD               [address] [phone]



                                                [place] [id number] 

 

                Zuly Chung Spouse          [address]    [phone]

[phone]



                                                [place] [address] 

 

                ZULY CENTENO X               [address] [facility]    Unavailable



                                                [place] [address] 

 

                ZULY CENTENO   Unavailable     [address] [phone]



                                                [place] [address] 

 

                GARRETT ESCALERA Unavailable     [address]     [phone]



                                                [place] [id number] 

 

                NADER CENTENO     Unavailable     [address] [phone]



                                                [place] [id number] 

 

                LORRAINE ESCALERA Unavailable     [address]     [phone]



                                                [place] [address] 









Care Team Providers







                    Name                Role                Phone

 

                    KIMANI SANDOVAL Primary Care Physician Unavailable

 

                    WANDA DAN    Attending Clinician Unavailable

 

                    RADIOLOGY           Attending Clinician Unavailable

 

                    Wanda Dan MD Attending Clinician [phone]

 

                    TITI REILLY     Attending Clinician Unavailable

 

                    Ernesto Osman   Attending Clinician Unavailable

 

                    NAYELI CABRERA      Attending Clinician Unavailable

 

                    WANDA DAN    Admitting Clinician Unavailable









Payers







           Payer Name Policy Type Policy Number Effective Date Expiration Date S

gee

 

           [facility] -            [id number] 2008-10-01 00:00:00          

  



           OUT OF STATE                                             







Problems







       Condition Condition Condition Status Onset  Resolution Last   Treating Co

mments 

Source



       Name   Details Category        Date   Date   Treatment Clinician        



                                                 Date                 

 

       Heartburn Heartburn Disease Active 2020                      Overview: 

Univers



                                   1-17                        Formattin ity of



                                   00:00:                      g of this [place]



                                   00                          note   Medical



                                                               might be Branch



                                                               different 



                                                               from the 



                                                               original. 



                                                               Added  



                                                               automatic 



                                                               ally from 



                                                               request 



                                                               for    



                                                               surgery 



                                                               [id number] 

 

       Diverticul Diverticul Disease Active                              U

nivers



       itis   itis                 -15                               ity of



                                   00:00:                             [place]



                                   00                                 Medical



                                                                      Branch

 

       Morbid Morbid Disease Active                              Univers



       obesity obesity               9-15                               ity of



       with body with body               00:00:                             [place]

[place]



       mass index mass index               00                                 Me

dical



       of     of                                                      Branch



       40.0-49.9 40.0-49.9                                                  







Allergies, Adverse Reactions, Alerts







       Allergy Allergy Status Severity Reaction(s) Onset  Inactive Treating Comm

ents 

Source



       Name   Type                        Date   Date   Clinician        

 

       No Known DA     Active U             2018                      [facility]



       Allergie                             2-14                        [place]



       s                                  00:00:                      [facility]



                                          00                          dic



                                                                      [facility]



                                                                      l

 

       No Known DA     Active U             2018                      [facility]



       Allergie                             105                        [place]



       s                                  00:00:                      [facility]



                                          00                          dic



                                                                      Hospita



                                                                      l

 

       No Known DA     Active U             2018                      [facility]



       Allergie                             1-                        [place]



       s                                  00:00:                      [facility]



                                          00                          dic



                                                                      Hospita



                                                                      l

 

       No Known DA     Active U                                   [facility]



       Allergie                             -                        [place]



       s                                  00:00:                      [facility]



                                          00                          dic



                                                                      Hospita



                                                                      l

 

       NO KNOWN Drug   Active                                           Univers



       ALLERGIE Class                                                   ity of



       S                                                              [facility]







Social History







           Social Habit Start Date Stop Date  Quantity   Comments   Source

 

           History [facility]                                             University o

f



           Alcohol Std Drinks                                             [place] 

Medical



                                                                  Branch

 

           History [facility]                                             University o

f



           Alcohol Binge                                             [place] [facility]

al



                                                                  Branch

 

           History [facility]                                             University o

f



           Alcohol Comment                                             [place] [facility]

ical



                                                                  Branch

 

           Exposure to                       Yes                   University of



           SARS-CoV-2 (event)                                             [facility]



                                                                  Branch

 

           History of tobacco                       Cigarette Smoker            

University of



           use                                                    [facility]



                                                                  Branch

 

           Alcohol intake 2021 Lifetime              University

 of



                      00:00:00   00:00:00   non-drinker            [place] [facility]



                                            (finding)             Branch

 

           History SDOH 2019-11-15 2019-11-15 1                     University o

f



           Alcohol Frequency 00:00:00   00:00:00                         [facility]



                                                                  Branch

 

           Cigarettes smoked 2019-09-15 2019-09-15                       Univers

ity of



           current (pack per 00:00:00   00[facility]00:00                         [facility]



           day) - Reported                                             Branch

 

           Tobacco use and 2019-09-15 2019-09-15 Never used            Universit

y of



           exposure   00:00:00   00:00:00                         [facility]

 

           Sex Assigned At 1965                       Universit

y of



           Birth      00:00:00   00:00:00                         [facility]









                Smoking Status  Start Date      Stop Date       Source

 

                Current every day smoker 2019-09-15 00:00:00                 Uni

versity [facility]







Medications







       Ordered Filled Start  Stop   Current Ordering Indication Dosage Frequency

 Signature

                    Comments            Components          Source



     Medication Medication Date Date Medication? Clinician                (SIG) 

          



     Name Name                                                   

 

     PANTOPRAZOL            Yes       [id number]           TAKE 1           

Univers



     E 20 mg EC      9-07                               TABLET BY           ity 

of



     tablet      00:00:                               MOUTH           [place]



               00                                 EVERY DAY           Medical



                                                                 Branch

 

     PANTOPRAZOL            Yes       [id number]           TAKE 1           

Univers



     E 20 mg EC      9-07                               TABLET BY           ity 

of



     tablet      00:00:                               MOUTH           [place]



               00                                 EVERY DAY           Medical



                                                                 Branch

 

     escitalopra      2020      Yes            10mg      Take 10 mg           

Univers



     m oxalate      2-07                               by mouth           ity of



     10 mg      00:00:                               every           [place]



     tablet      00                                 morning.           Medical



                                                                 Branch

 

     terbinafine      2020      Yes            250mg      Take 250           U

nivers



      mg      2-07                               mg by           ity of



     tablet      00:00:                               mouth           [place]



               00                                 daily.           Medical



                                                                 Branch

 

     escitalopra      2020      Yes            10mg      Take 10 mg           

Univers



     m oxalate      2-07                               by mouth           ity of



     10 mg      00:00:                               every           [place]



     tablet      00                                 morning.           Medical



                                                                 Branch

 

     terbinafine      2020      Yes            250mg      Take 250           U

nivers



      mg      2-07                               mg by           ity of



     tablet      00:00:                               mouth           [place]



               00                                 daily.           Medical



                                                                 Branch

 

     QUEtiapine            Yes            6.25mg      Take 6.25           

Univers



     25 mg      9-17                               mg by           ity of



     tablet      00:00:                               mouth           [place]



               00                                 daily.           Medical



                                                                 Branch

 

     QUEtiapine            Yes            6.25mg      Take 6.25           

Univers



     25 mg      9-17                               mg by           ity of



     tablet      00:00:                               mouth           [place]



               00                                 daily.           Medical



                                                                 Branch







Vital Signs







             Vital Name   Observation Time Observation Value Comments     Source

 

             Systolic blood 2021 20:42:00 116 mm[Hg]                Univer

sity of



             pressure                                            [facility]

 

             Diastolic blood 2021 20:42:00 68 mm[Hg]                 Unive

rsity of



             [facility]

 

             Heart rate   2021 20:42:00 85 /min                   Universi

ty [facility]

 

             Body temperature 2021 20:42:00 36.28 Aleida                 Univ

ersity of



                                                                 [facility]

 

             Respiratory rate 2021 20:42:00 16 /min                   [facility]

 

             Body height  2021 20:42:00 154.9 cm                  [facility]

 

             Body weight  2021 20:42:00 99.61 kg                  [facility]

 

             BMI          2021 20:42:00 41.49 kg/m2               [facility]

 

             Oxygen saturation in 2021 20:42:00 96 /min                   

[facility]



             Arterial blood by                                        [place] [facility]i

walt



             Pulse oximetry                                        Branch







Procedures







                Procedure       Date / Time Performed Performing Clinician Sour

e

 

                US ABDOMEN LIMITED 2022-01-10 19:17:30 Jozef Marcelo

[facility]

 

                CONSENT/REFUSAL FOR 2022-01-10 18:35:32 Doctor Unassigned, No [facility]



                DIAGNOSIS AND                   Name            [facility]



                TREATMENT                                       

 

                ASSIGNMENT OF BENEFITS 2022-01-10 18:35:18 Doctor Unassigned, No

 [facility]







Encounters







        Start   End     Encounter Admission Attending Care    Care    Encounter 

Source



        Date/Time Date/Time Type    Type    Clinicians Facility Department ID   

   

 

        2021-10-30         Emergency                 [facility]    [id number] 

Univers



        14:29:53                                                         [facility]

 

        2021-10-30         [facility] R       [facility]     [id number]

13 Univers



        06:29:11                         WANDA proctor [facility]

 

        2022 Outpatient R       RADIOLOGY [facility]    [id number]

[id number] Univers



        00:00:00 00:00:00                                                 [facility]

 

        2022-01-10 2022-01-10 [facility]    [id number]

[id number] Univers



        12:35:22 23:59:00 Encounter         Wanda WATSON [id number]        

 ity [facility] [id number]         [facility]  [phone]         Medi

walt



                                                        806             Branch

 

        2022-01-10 2022-01-10 Outpatient R       SY [facility]    [id number]

[id number] Univers



        12:35:22 23:59:00                 WANDA proctor [facility]

 

        2021 Outpatient R       SY [facility]    [id number]

[id number] Univers



        14:45:00 15:17:57                 WANDA proctor [facility]

 

        2021 Office          [facility]    [id number]

[id number] Univers



        14:45:00 15:17:57 Visit           Wanda WATSON [id number]         i

ty of



                                                [facility] [id number]         [place]

[place]



                                                PROFESSIO [phone]         Me

dical



                                                NAL     [address] [place] [address]                 

 

        2021 Refill          [facility]    [id number]

[id number] Univers



        00:00:00 00:00:00                 Wanda Watson [id number]         i

ty of



                                                [place] [id number]         [place]

s



                                                [employer] [phone]         Me

dic[address]                 

 

        2021 Outpatient R       TOMMIE [facility]    [id number]

[id number] Univers



        13:00:00 13:00:00                 [facility]

 

        2021-06-15 2021-06-15 Outpatient R       SY [facility]    [id number]

[id number] Univers



        15:30:00 15:30:00                 WANDA                          malgorzata [facility]

 

        2021-01-15 2021-01-15 Outpatient R       RADIOLOGY [facility]    [id number]

[id number] Univers



        00:00:00 00:00:00                                                 hitesh [facility]

 

        2020 Outpatient R       SY [facility]    [id number]

[id number] Univers



        09:30:00 09:30:00                 WANDA proctor [facility]

 

        2020 Outpatient R       SY [facility]    [id number]

[id number] Univers



        10:30:00 10:30:00                 WANDA proctor [facility]

 

        2020 Outpatient R       SY [facility]    [id number]

[id number] Univers



        13:45:00 13:45:00                 WANDA proctor [facility]

 

        2020 Outpatient R       SY [facility]    [id number]

[id number] Univers



        09:30:00 09:30:00                 WANDA                          malgorzata [facility]

 

        2020 Outpatient R       DEBORAH  [facility]    [id number]

[id number] Univers



        10:40:00 10:40:00                 NAYELI proctor [facility]







Results







           Test Description Test Time  Test Comments Results    Result     Sourc

e



                                                       Comments   

 

           NM CT SPECT (EG, 2021            *********************         

   



           HEAD, NECK, CHEST, 16:02:11              *********************       

     



           PELVIS) SINGLE                       ******************[facility]           

 



           AREA [[id number]]                       [facility] MEDICAL            



                                            IMAGINGName: VERÓNICA CENTENO : 1965            



                                            Sex:                  



                                            F********************            



                                            *********************            



                                            *******************CL            



                                            INICAL INDICATION:            



                                            M51.36 Other            



                                            intervertebral disc            



                                            degeneration, lumbar            



                                            [id number]            



                                            Intervertebral disc            



                                            disorders with            



                                            radiculopathy, lumbar            



                                            [id number] Spinal            



                                            stenosis, lumbar            



                                            region without            



                                            neurogenic            



                                            claudMODALITY:            



                                            Discovery NM/CT            



                                            670TECHNIQUE: 25 mCi            



                                            Tc 99m MDP are            



                                            injected IV. After a            



                                            suitable time delay,            



                                            whole body imaging            



                                            images were obtained.            



                                            SPECT imaging of the            



                                            lumbar spine is            



                                            performed with            



                                            computer and            



                                            physician-assisted            



                                            2-D and 3-D            



                                            reconstruction.            



                                            Co-registered low            



                                            dose limited            



                                            diagnostic CT images            



                                            are obtained at the            



                                            level of SPECT            



                                            imaging.Computed            



                                            Tomography Dose            



                                            Index: 5.44            



                                            mGy.FINDINGS:COMPARIS            



                                            ON: Fusion CT exam.CT            



                                            Comments:             



                                            None.Symmetric            



                                            bilateral renal            



                                            function is            



                                            observed.Mild to            



                                            moderate arthritic            



                                            uptake is noted            



                                            bilateral shoulders,            



                                            bilateral wrists,            



                                            bilateral mid feet.            



                                            Mild uptake is noted            



                                            at the patellofemoral            



                                            joints                



                                            bilaterally.SPECT CT            



                                            fusion imaging of the            



                                            lumbar spine            



                                            demonstrates no            



                                            significant            



                                            intervertebral disc            



                                            uptake to be present.            



                                            Moderate bilateral            



                                            L4-5 facet uptake is            



                                            most                  



                                            conspicuous.IMPRESSIO            



                                            N:See comments            



                                            above.PQRS [id number]: [id number]

## 2022-11-25 NOTE — EDPHYS
Physician Documentation                                                                           

 [facility]                                                                 

Name: Emma Pizarro                                                                                 

Age: 57 yrs                                                                                       

Sex: Female                                                                                       

: 1965                                                                                   

MRN: [id number]                                                                                   

Arrival Date: 2022                                                                          

Time: 20:40                                                                                       

Account#: [id number]                                                                            

Bed 11                                                                                            

Private MD:                                                                                       

ED Physician Pj Hudson                                                                         

HPI:                                                                                              

                                                                                             

21:05 This 57 yrs old  Female presents to ER via Ambulatory with complaints of        cp  

      Toothache.                                                                                  

21:05 The patient presents with pain. The problem is located in the left upper jaw.           cp  

21:05 Onset: The symptoms/episode began/occurred 4 day(s) ago.                                cp  

21:05 Associated signs and symptoms: Pertinent negatives: dysphagia, fever, inability to eat. cp  

      Patient was seen yesterday in this ED for similar complaint. Currently taking               

      penicillin antibiotic. Patient requesting shot of pain medication.                          

                                                                                                  

Historical:                                                                                       

- Allergies:                                                                                      

20:50 No Known Allergies;                                                                     kr3 

- Home Meds:                                                                                      

20:50 Seroquel Oral [Active];                                                                 kr3 

- PMHx:                                                                                           

20:50 None;                                                                                   kr3 

- PSHx:                                                                                           

20:50 Total abdominal hysterectomy;                                                           kr3 

                                                                                                  

- Immunization history:: Adult Immunizations not up to date.                                      

- Social history:: Smoking status: Patient reports the use of cigarette tobacco                   

  products, smokes one-half pack cigarettes per day.                                              

                                                                                                  

                                                                                                  

ROS:                                                                                              

21:09 Constitutional: Negative for body aches, chills, fever, poor PO intake.                 cp  

21:09 ENT: Positive for dental pain, left upper jaw pain, Negative for drainage from ear(s),      

      ear pain, sore throat, difficulty swallowing, difficulty handling secretions.               

21:09 Neck: Negative for pain with movement, pain at rest, stiffness.                             

                                                                                                  

Exam:                                                                                             

21:13 Constitutional: The patient appears in no acute distress, alert, awake,                 cp  

      non-diaphoretic, non-toxic, well developed, well nourished.                                 

21:13 Head/face: Noted is tenderness, that is mild, of the  left cheek.                       cp  

21:13 Eyes: Periorbital structures: appear normal, Conjunctiva: normal, no exudate, no            

      injection, Sclera: no appreciated abnormality, Lids and lashes: appear normal,              

      bilaterally.                                                                                

21:13 ENT: External ear(s): are unremarkable, Ear canal(s): are normal, clear, TM's:              

      dullness, bilaterally, Nose: is normal, Mouth: Lips: moist, Oral mucosa: pink and           

      intact, moist, Posterior pharynx: Airway: no evidence of obstruction, patent, swelling,     

      is not appreciated, erythema, is not appreciated, exudate, is not appreciated, Dental       

      exam: abscess, is not appreciated, dental caries, that is moderate, diffusely, gum          

      swelling, not appreciated, missing teeth, diffusely, pain, that is mild, specifically       

      in the  upper left first bicuspid (#12), upper left second bicuspid (#13), upper left       

      first molar (#14) and upper left second molar (#15), Voice: is normal.                      

21:13 Neck: ROM/movement: is normal, is supple, without pain, no range of motions                 

      limitations, Lymph nodes: no appreciated lymphadenopathy.                                   

21:13 Chest/axilla: Inspection: normal.                                                           

21:13 Cardiovascular: Rate: normal, Rhythm: regular.                                              

21:13 Respiratory: the patient does not display signs of respiratory distress,  Respirations:     

      normal, no use of accessory muscles, no retractions, Breath sounds: are clear               

      throughout, no decreased breath sounds.                                                     

21:13 Abdomen/GI: Exam negative for discomfort, distension, guarding, Inspection: abdomen         

      appears normal.                                                                             

21:13 Skin: no rash present.                                                                      

21:13 Neuro: Orientation: to person, place \T\ time. Mentation: is normal.                        

                                                                                                  

Vital Signs:                                                                                      

20:43  / 78; Pulse 78; Resp 18; Temp 97.5(TE); Pulse Ox 100% ; Weight 94.8 kg; Height 5 kr3 

      ft. 1 in. (154.94 cm); Pain 9/10;                                                           

20:43 Body Mass Index 39.49 (94.80 kg, 154.94 cm)                                             kr3 

                                                                                                  

MDM:                                                                                              

20:52 Patient medically screened.                                                             cp  

21:23 Data reviewed: vital signs, nurses notes.                                               cp  

21:23 Differential diagnosis: dental caries, dental abscess, pericoronitis. Counseling: I had cp  

      a detailed discussion with the patient and/or guardian regarding: the historical            

      points, exam findings, and any diagnostic results supporting the discharge/admit            

      diagnosis. ED course: Patient refusing any labs to be drawn and/or imaging studies at       

      this time.                                                                                  

                                                                                                  

Administered Medications:                                                                         

21:22 CANCELLED (Patient Refused): morphine 4 mg IVP once over 4 mins                         tw5 

21:28 Drug: Ketorolac 60 mg Route: IM; Site: right ventrogluteal;                              

21:30 Follow up: Response: No adverse reaction; Medication administered at discharge.          

                                                                                                  

                                                                                                  

Disposition:                                                                                      

22:00 Co-signature as Attending Physician, Pj Hudson MD.                                    rn  

                                                                                                  

Disposition Summary:                                                                              

22 21:24                                                                                    

Discharge Ordered                                                                                 

      Location: Home                                                                          cp  

      Problem: an ongoing problem                                                             cp  

      Symptoms: are unchanged                                                                 cp  

      Condition: Stable                                                                       cp  

      Diagnosis                                                                                   

        - Disorder of teeth and supporting structures, unspecified                            cp  

      Followup:                                                                               cp  

        - With: Raymond Castrejon DDS                                                               

        - When: 2 - 3 days                                                                         

        - Reason: Recheck today's complaints                                                       

      Discharge Instructions:                                                                     

        - Discharge Summary Sheet                                                             cp  

        - Dental Pain                                                                         cp  

      Forms:                                                                                      

        - Medication Reconciliation Form                                                      cp  

        - Thank You Letter                                                                    cp  

        - Antibiotic Education                                                                cp  

        - Prescription Opioid Use                                                             cp  

Signatures:                                                                                       

Dispatcher [employer]                           EDMS                                                 

Pj Hudson MD MD   rn                                                   

Mohit Duval PA PA cp Wood, Tiffany                                                                                  

Lauryn Thomas RN                        RN   kr3                                                  

                                                                                                  

Corrections: (The following items were deleted from the chart)                                    

: 21:09 IV Saline Lock ordered. cp                                                        : 21:09 morphine 4 mg IVP once over 4 mins ordered. cp                                    : 21:09 CBC+H.LAB.BRZ ordered. EDMS                                                       EDMS

: 21:09 BASIC METABOLIC PANEL+C.LAB.BRZ ordered. EDMS                                     EDMS

21: 21:09 Facial Bones W/ Con \T\ MPR+CT.RAD.BRZ ordered. EDMS                                EDMS

                                                                                                  

**************************************************************************************************

## 2022-11-25 NOTE — ER
Nurse's Notes                                                                                     

 [facility] [place] [facility]\""                                                                 

Name: Emma Pizarro                                                                                 

Age: 57 yrs                                                                                       

Sex: Female                                                                                       

: 1965                                                                                   

MRN: [id number]                                                                                   

Arrival Date: 2022                                                                          

Time: 20:40                                                                                       

Account#: [id number]                                                                            

Bed 11                                                                                            

Private MD:                                                                                       

Diagnosis: Disorder of teeth and supporting structures, unspecified                               

                                                                                                  

Presentation:                                                                                     

                                                                                             

20:43 Chief complaint: Patient states: toothache that started 6-7 weeks age and was seen by   kr3 

      dentist, was seen in the ED yesterday, given prescription yesterday and does not seem       

      to help. Returned hoping to get the same pain medication that helped yesterday.             

      Coronavirus screen: Vaccine status: Patient reports being unvaccinated. Client denies       

      travel out of the [place] in the last 14 days. Ebola Screen: Patient denies travel to an       

      Ebola-affected area in the 21 days before illness onset. Initial Sepsis Screen: Does        

      the patient meet any 2 criteria? No. Patient's initial sepsis screen is negative. Does      

      the patient have a suspected source of infection? No. Patient's initial sepsis screen       

      is negative. Risk Assessment: Do you want to hurt yourself or someone else? Patient         

      reports no desire to harm self or others. Onset of symptoms was October 15, 2022.           

20:43 Method Of Arrival: Ambulatory                                                           kr3 

20:43 Acuity: REZA 4                                                                           kr3 

                                                                                                  

Triage Assessment:                                                                                

20:50 General: Appears in no apparent distress. uncomfortable, Behavior is calm, cooperative, kr3 

      appropriate for age. Pain: Complains of pain in toothache.                                  

21:29 EENT: Reports pain in left jaw.                                                         tw5 

                                                                                                  

Historical:                                                                                       

- Allergies:                                                                                      

20:50 No Known Allergies;                                                                     kr3 

- Home Meds:                                                                                      

20:50 Seroquel Oral [Active];                                                                 kr3 

- PMHx:                                                                                           

20:50 None;                                                                                   kr3 

- PSHx:                                                                                           

20:50 Total abdominal hysterectomy;                                                           kr3 

                                                                                                  

- Immunization history:: Adult Immunizations not up to date.                                      

- Social history:: Smoking status: Patient reports the use of cigarette tobacco                   

  products, smokes one-half pack cigarettes per day.                                              

                                                                                                  

                                                                                                  

Screenin:29 Abuse screen: Denies threats or abuse. Denies injuries from another. Nutritional        tw5 

      screening: No deficits noted. Tuberculosis screening: No symptoms or risk factors           

      identified. Fall Risk None identified.                                                      

                                                                                                  

Assessment:                                                                                       

21:29 General: Appears in no apparent distress. uncomfortable, Behavior is calm. Neuro: No    tw5 

      deficits noted. Cardiovascular: No deficits noted. Respiratory: No deficits noted. GI:      

      No deficits noted.                                                                          

                                                                                                  

Vital Signs:                                                                                      

20:43  / 78; Pulse 78; Resp 18; Temp 97.5(TE); Pulse Ox 100% ; Weight 94.8 kg; Height 5 kr3 

      ft. 1 in. (154.94 cm); Pain 9/10;                                                           

20:43 Body Mass Index 39.49 (94.80 kg, 154.94 cm)                                             kr3 

                                                                                                  

ED Course:                                                                                        

20:40 Patient arrived in ED.                                                                  bp1 

20:43 Mohit Duval PA is PHCP.                                                                cp  

20:43 Pj Hudson MD is Attending Physician.                                                cp  

20:50 Triage completed.                                                                       kr3 

20:51 Arm band placed on right wrist. Patient placed in an exam room, on a stretcher.         kr3 

21:22 Ayesha Ivory is Primary Nurse.                                                         tw5 

21:23 Raymond Castrejon DDS is Referral Physician.                                             cp  

21:29 Patient has correct armband on for positive identification.                             tw5 

21:29 No provider procedures requiring assistance completed. Patient did not have IV access   tw5 

      during this emergency room visit.                                                           

                                                                                                  

Administered Medications:                                                                         

21:22 CANCELLED (Patient Refused): morphine 4 mg IVP once over 4 mins                         tw5 

21:28 Drug: Ketorolac 60 mg Route: IM; Site: right ventrogluteal;                             tw5 

21:30 Follow up: Response: No adverse reaction; Medication administered at discharge.         tw5 

                                                                                                  

                                                                                                  

Medication:                                                                                       

21:29 VIS not applicable for this client.                                                     tw5 

                                                                                                  

Outcome:                                                                                          

21:24 Discharge ordered by MD.                                                                cp  

21:29 Discharged to home ambulatory.                                                          tw5 

21:29 Condition: good                                                                             

21:29 Discharge instructions given to patient, Instructed on discharge instructions, follow       

      up and referral plans. Demonstrated understanding of instructions, follow-up care.          

21:41 Patient left the ED.                                                                    tw5 

                                                                                                  

Signatures:                                                                                       

Mohit Duval PA PA cp Paniauga, Brittany                           bp1                                                  

Ayesha Ivory                                tw5                                                  

Lauryn Thomas, RN                        RN   kr3                                                  

                                                                                                  

**************************************************************************************************

## 2022-12-17 ENCOUNTER — HOSPITAL ENCOUNTER (EMERGENCY)
Dept: HOSPITAL 97 - ER | Age: 57
LOS: 1 days | Discharge: HOME | End: 2022-12-18
Payer: COMMERCIAL

## 2022-12-17 DIAGNOSIS — K57.30: ICD-10-CM

## 2022-12-17 DIAGNOSIS — K80.80: ICD-10-CM

## 2022-12-17 DIAGNOSIS — N30.91: Primary | ICD-10-CM

## 2022-12-17 DIAGNOSIS — R16.0: ICD-10-CM

## 2022-12-17 LAB
ALBUMIN SERPL BCP-MCNC: 3.2 G/DL (ref 3.4–5)
ALP SERPL-CCNC: 94 U/L (ref 45–117)
ALT SERPL W P-5'-P-CCNC: 46 U/L (ref 13–56)
AST SERPL W P-5'-P-CCNC: 21 U/L (ref 15–37)
BUN BLD-MCNC: 17 MG/DL (ref 7–18)
GLUCOSE SERPLBLD-MCNC: 89 MG/DL (ref 74–106)
HCT VFR BLD CALC: 38.6 % (ref 36–45)
LIPASE SERPL-CCNC: 112 U/L (ref 73–393)
LYMPHOCYTES # SPEC AUTO: 3.3 K/UL (ref 0.7–4.9)
MCV RBC: 86.6 FL (ref 80–100)
PMV BLD: 6.9 FL (ref 7.6–11.3)
POTASSIUM SERPL-SCNC: 3.9 MMOL/L (ref 3.5–5.1)
RBC # BLD: 4.46 M/UL (ref 3.86–4.86)

## 2022-12-17 PROCEDURE — 80053 COMPREHEN METABOLIC PANEL: CPT

## 2022-12-17 PROCEDURE — 96375 TX/PRO/DX INJ NEW DRUG ADDON: CPT

## 2022-12-17 PROCEDURE — 85025 COMPLETE CBC W/AUTO DIFF WBC: CPT

## 2022-12-17 PROCEDURE — 87086 URINE CULTURE/COLONY COUNT: CPT

## 2022-12-17 PROCEDURE — 96366 THER/PROPH/DIAG IV INF ADDON: CPT

## 2022-12-17 PROCEDURE — 87077 CULTURE AEROBIC IDENTIFY: CPT

## 2022-12-17 PROCEDURE — 96365 THER/PROPH/DIAG IV INF INIT: CPT

## 2022-12-17 PROCEDURE — 99284 EMERGENCY DEPT VISIT MOD MDM: CPT

## 2022-12-17 PROCEDURE — 74177 CT ABD & PELVIS W/CONTRAST: CPT

## 2022-12-17 PROCEDURE — 87088 URINE BACTERIA CULTURE: CPT

## 2022-12-17 PROCEDURE — 87186 SC STD MICRODIL/AGAR DIL: CPT

## 2022-12-17 PROCEDURE — 81003 URINALYSIS AUTO W/O SCOPE: CPT

## 2022-12-17 PROCEDURE — 81015 MICROSCOPIC EXAM OF URINE: CPT

## 2022-12-17 PROCEDURE — 36415 COLL VENOUS BLD VENIPUNCTURE: CPT

## 2022-12-17 PROCEDURE — 83690 ASSAY OF LIPASE: CPT

## 2022-12-17 NOTE — ER
Nurse's Notes                                                                                     

 [facility]                                                                 

Name: Emma Pizarro                                                                                 

Age: 57 yrs                                                                                       

Sex: Female                                                                                       

: 1965                                                                                   

MRN: [id number]                                                                                   

Arrival Date: 2022                                                                          

Time: 21:11                                                                                       

Account#: [id number]                                                                            

Bed 6                                                                                             

Private MD:                                                                                       

Diagnosis: Dysuria;UTI/ Urinary tract infection, site not specified;Hematuria,                    

  unspecified;Cystitis, unspecified with hematuria;Diverticulosis of intestine, part              

  unspecified, without perforation or abscess without bleeding;Other cholelithiasis               

  without obstruction                                                                             

                                                                                                  

Presentation:                                                                                     

                                                                                             

21:16 Chief complaint: Blood in urine and pain with urination x 2 hours. Coronavirus screen:  hb  

      At this time, the client does not indicate any symptoms associated with coronavirus-19.     

      Ebola Screen: No symptoms or risks identified at this time. Risk Assessment: Do you         

      want to hurt yourself or someone else? Patient reports no desire to harm self or            

      others. Onset of symptoms was 2022.                                            

21:16 Method Of Arrival: Ambulatory                                                           hb  

21:16 Acuity: REZA 3                                                                           hb  

23:42 Initial Sepsis Screen: Does the patient meet any 2 criteria? No. Patient's initial      aa9 

      sepsis screen is negative. Does the patient have a suspected source of infection? No.       

      Patient's initial sepsis screen is negative.                                                

                                                                                                  

Historical:                                                                                       

- Allergies:                                                                                      

21:17 No Known Allergies;                                                                     hb  

- Home Meds:                                                                                      

21:17 Seroquel Oral [Active];                                                                 hb  

- PSHx:                                                                                           

21:17 Total abdominal hysterectomy;                                                           hb  

                                                                                                  

- Immunization history:: Adult Immunizations up to date.                                          

- Social history:: Smoking status: Patient denies any tobacco usage or history of.                

- Family history:: not pertinent.                                                                 

                                                                                                  

                                                                                                  

Screenin:41 [facility] ED Fall Risk Assessment (Adult) History of falling in the last 3 months,       aa9 

      including since admission No falls in past 3 months (0 pts). Humpty Dumpty Scale Fall       

      Assessment Tool (age< 18yrs) Age 13 years and above (1 pt) Gender Female (1 pt)             

      Diagnosis Other diagnosis (1 pt) Cognitive Impairments Oriented to own ability (1 pt).      

      Abuse screen: Denies threats or abuse. Denies injuries from another. Nutritional            

      screening: No deficits noted. Tuberculosis screening: No symptoms or risk factors           

      identified. Fall Risk No fall in past 12 months (0 pts).                                    

                                                                                                  

Assessment:                                                                                       

23:30 Reassessment: Patient appears in no apparent distress at this time. discharge pending   aa9 

      Levofloxican infusion completion.                                                           

23:39 General: Appears uncomfortable, obese, Behavior is calm, cooperative, appropriate for   aa9 

      age. Pain: Complains of pain in suprapubic area, right lower quadrant and left lower        

      quadrant. Neuro: Level of Consciousness is awake, alert, obeys commands, Oriented to        

      person, place, time, situation. Respiratory: Airway is patent Respiratory effort is         

      even, unlabored.                                                                            

                                                                                                  

Vital Signs:                                                                                      

21:16  / 57; Pulse 83; Resp 16; Temp 98.4; Pulse Ox 97% on R/A; Weight 94.35 kg; Height hb  

      5 ft. 1 in. (154.94 cm); Pain 9/10;                                                         

23:40  / 67; Pulse 64; Resp 19 S; Pulse Ox 100% on R/A;                                 aa9 

21:16 Body Mass Index 39.30 (94.35 kg, 154.94 cm)                                             hb  

                                                                                                  

ED Course:                                                                                        

21:11 Patient arrived in ED.                                                                  jj6 

21:17 Triage completed.                                                                       hb  

21:17 Arm band placed on.                                                                     hb  

21:21 Mohit Kurtz MD is Attending Physician.                                             iván 

22:01 Inserted saline lock: 20 gauge in left antecubital area, using aseptic technique. Blood oe  

      collected.                                                                                  

23:18 CT Abd/Pelvis - IV Contrast Only In Process Unspecified.                                EDMS

23:41 Patient has correct armband on for positive identification. Bed in low position. Call   aa9 

      light in reach. Side rails up X2. Adult w/ patient. Door closed. Warm blanket given.        

23:41 No provider procedures requiring assistance completed.                                  aa9 

                                                                                             

00:51 IV discontinued, intact, bleeding controlled, No redness/swelling at site. Pressure     aa9 

      dressing applied.                                                                           

                                                                                                  

Administered Medications:                                                                         

                                                                                             

22:08 Drug: NS 0.9% 1000 ml Route: IV; Rate: 1 bolus; Site: left antecubital;                 aa9 

:08 Drug: Zofran (Ondansetron) 4 mg Route: IVP; Site: left antecubital;                     aa9 

22:08 Drug: morphine 4 mg Route: IVP; Infused Over: 4 mins; Site: left antecubital;           aa9 

22:08 Drug: Rocephin (cefTRIAXone) 1 grams Route: IV; Rate: per protocol; Site: left          aa9 

      antecubital;                                                                                

23:17 Drug: levofloxacin 750 mg Volume: 150 ml; Route: IVPB; Infused Over: 90 mins; Site:     aa9 

      left antecubital;                                                                           

                                                                                             

00:50 Follow up: Response: No adverse reaction; IV Status: Completed infusion; IV Intake:     aa9 

      150ml                                                                                       

                                                                                             

23:17 Drug: Pyridium (phenazopyridine) 200 mg Route: PO;                                      aa9 

                                                                                             

00:37 Follow up: Response: No adverse reaction                                                aa9 

                                                                                                  

                                                                                                  

Medication:                                                                                       

                                                                                             

23:41 VIS not applicable for this client.                                                     aa9 

                                                                                                  

Intake:                                                                                           

                                                                                             

00:50 IV: 150ml; Total: 150ml.                                                                aa9 

                                                                                                  

Outcome:                                                                                          

                                                                                             

23:24 Discharge ordered by MD. minor 

                                                                                             

00:51 Discharged to home ambulatory, with family.                                             aa9 

      Condition: stable                                                                           

      Discharge instructions given to patient, family, Instructed on discharge instructions,      

      follow up and referral plans. medication usage, Demonstrated understanding of               

      instructions, follow-up care, medications, Prescriptions given X 3.                         

00:51 Patient left the ED.                                                                    aa9 

                                                                                                  

Addendum:                                                                                         

2022                                                                                        

     10:40 Addendum: Culture Results: Positive urine culture. No further action required. Bacteria i
w

           sensitive to prescribed antibiotic.                                                    

                                                                                                  

Signatures:                                                                                       

Dispatcher [employer]                           EDMS                                                 

Mohit Kurtz MD MD cha Williams, Irene, RN                     Aishwarya Miller RN RN hb Espinosa, Orlando oe Jeffries, Jennifer                           jj6                                                  

Lennie Reece RN                       RN   aa9                                                  

                                                                                                  

**************************************************************************************************

## 2022-12-17 NOTE — EDPHYS
Physician Documentation                                                                           

 [facility]                                                                 

Name: Emma Pizarro                                                                                 

Age: 57 yrs                                                                                       

Sex: Female                                                                                       

: 1965                                                                                   

MRN: [id number]                                                                                   

Arrival Date: 2022                                                                          

Time: 21:11                                                                                       

Account#: [id number]                                                                            

Bed 6                                                                                             

Private MD:                                                                                       

ED Physician Mohit Kurtz                                                                      

HPI:                                                                                              

                                                                                             

22:49 This 57 yrs old  Female presents to ER via Ambulatory with complaints of        iván 

      Urinary Incontinence, Hematuria.                                                            

22:49 The patient presents with urinary symptoms, frequency, hematuria, hesitancy,            iván 

      incontinence, urgency. Onset: The symptoms/episode began/occurred just prior to             

      arrival, today. Modifying factors: The symptoms are alleviated by nothing, the symptoms     

      are aggravated by nothing. Associated signs and symptoms: The patient has no apparent       

      associated signs or symptoms. Severity of symptoms: At their worst the symptoms were        

      mild, moderate, in the emergency department the symptoms are unchanged. The patient is      

      sexually active, reports multiple partners. The patient has not experienced similar         

      symptoms in the past.                                                                       

                                                                                                  

Historical:                                                                                       

- Allergies:                                                                                      

21:17 No Known Allergies;                                                                     hb  

- Home Meds:                                                                                      

21:17 Seroquel Oral [Active];                                                                 hb  

- PSHx:                                                                                           

21:17 Total abdominal hysterectomy;                                                           hb  

                                                                                                  

- Immunization history:: Adult Immunizations up to date.                                          

- Social history:: Smoking status: Patient denies any tobacco usage or history of.                

- Family history:: not pertinent.                                                                 

                                                                                                  

                                                                                                  

ROS:                                                                                              

22:49 Constitutional: Negative for fever, chills, and weight loss, Eyes: Negative for injury, iván 

      pain, redness, and discharge, ENT: Negative for injury, pain, and discharge, Neck:          

      Negative for injury, pain, and swelling, Cardiovascular: Negative for chest pain,           

      palpitations, and edema, Respiratory: Negative for shortness of breath, cough,              

      wheezing, and pleuritic chest pain, Back: Negative for injury and pain, : Negative        

      for injury, bleeding, discharge, and swelling, MS/Extremity: Negative for injury and        

      deformity, Skin: Negative for injury, rash, and discoloration, Neuro: Negative for          

      headache, weakness, numbness, tingling, and seizure, Psych: Negative for depression,        

      anxiety, suicide ideation, homicidal ideation, and hallucinations, Allergy/Immunology:      

      Negative for hives, rash, and allergies, Endocrine: Negative for neck swelling,             

      polydipsia, polyuria, polyphagia, and marked weight changes, Hematologic/Lymphatic:         

      Negative for swollen nodes, abnormal bleeding, and unusual bruising.                        

22:49 Abdomen/GI: Positive for abdominal pain, abdominal cramps, of the suprapubic area.          

                                                                                                  

Exam:                                                                                             

22:49 Constitutional:  This is a well developed, well nourished patient who is awake, alert,  ivná 

      and in no acute distress. Head/Face:  Normocephalic, atraumatic. Eyes:  Pupils equal        

      round and reactive to light, extra-ocular motions intact.  Lids and lashes normal.          

      Conjunctiva and sclera are non-icteric and not injected.  Cornea within normal limits.      

      Periorbital areas with no swelling, redness, or edema. ENT:  Nares patent. No nasal         

      discharge, no septal abnormalities noted.  Tympanic membranes are normal and external       

      auditory canals are clear.  Oropharynx with no redness, swelling, or masses, exudates,      

      or evidence of obstruction, uvula midline.  Mucous membranes moist. Neck:  Trachea          

      midline, no thyromegaly or masses palpated, and no cervical lymphadenopathy.  Supple,       

      full range of motion without nuchal rigidity, or vertebral point tenderness.  No            

      Meningismus. Chest/axilla:  Normal chest wall appearance and motion.  Nontender with no     

      deformity.  No lesions are appreciated. Cardiovascular:  Regular rate and rhythm with a     

      normal S1 and S2.  No gallops, murmurs, or rubs.  Normal PMI, no JVD.  No pulse             

      deficits. Respiratory:  Lungs have equal breath sounds bilaterally, clear to                

      auscultation and percussion.  No rales, rhonchi or wheezes noted.  No increased work of     

      breathing, no retractions or nasal flaring. Back:  No spinal tenderness.  No                

      costovertebral tenderness.  Full range of motion. Female :  Normal external               

      genitalia. Skin:  Warm, dry with normal turgor.  Normal color with no rashes, no            

      lesions, and no evidence of cellulitis.                                                     

22:49 Abdomen/GI: Inspection: abdomen appears normal, Bowel sounds: normal, Palpation: mild       

      abdominal tenderness, moderate abdominal tenderness, in the suprapubic area, Liver: no      

      appreciated palpable abnormalities, Hernia: not appreciated.                                

22:49 : CVA tenderness, is absent, Pelvic Exam: is not necessary for this patient, Bladder:     

      tenderness, that is mild, that is moderate.                                                 

                                                                                                  

Vital Signs:                                                                                      

21:16  / 57; Pulse 83; Resp 16; Temp 98.4; Pulse Ox 97% on R/A; Weight 94.35 kg; Height hb  

      5 ft. 1 in. (154.94 cm); Pain 9/10;                                                         

23:40  / 67; Pulse 64; Resp 19 S; Pulse Ox 100% on R/A;                                 aa9 

21:16 Body Mass Index 39.30 (94.35 kg, 154.94 cm)                                             hb  

                                                                                                  

MDM:                                                                                              

21:23 Patient medically screened.                                                             [facility] 

22:52 Differential diagnosis: kidney stone, ovarian cyst, urinary tract infection. Data       [facility] 

      reviewed: vital signs, nurses notes, lab test result(s), radiologic studies, CT scan.       

      Data interpreted: Cardiac monitor: rate is 83 beats/min, rhythm is regular, Pulse           

      oximetry: on room air is 97 %. Counseling: I had a detailed discussion with the patient     

      and/or guardian regarding: the historical points, exam findings, and any diagnostic         

      results supporting the discharge/admit diagnosis, lab results, radiology results, the       

      need for outpatient follow up, for definitive care, a family practitioner.                  

                                                                                                  

                                                                                             

21:26 Order name: CBC with Diff; Complete Time: 22:30                                         [facility] 

                                                                                             

21:26 Order name: CMP; Complete Time: 22:30                                                   [facility] 

                                                                                             

21:26 Order name: Lipase; Complete Time: 22:30                                                [facility] 

                                                                                             

21:26 Order name: Urine Microscopic Only; Complete Time: 22:30                                [facility] 

                                                                                             

21:31 Order name: Urine Dipstick-Ancillary; Complete Time: 22:30                              [facility]

                                                                                             

21:53 Order name: Urine Culture                                                               [facility]

                                                                                             

21:26 Order name: CT Abd/Pelvis - IV Contrast Only                                            [facility] 

                                                                                             

21:26 Order name: IV Saline Lock; Complete Time: 22:02                                        [facility] 

                                                                                             

21:26 Order name: Labs collected and sent; Complete Time: 22:02                               [facility] 

                                                                                             

21:26 Order name: Urine Dipstick-Ancillary (obtain specimen); Complete Time: 21:55            [facility] 

                                                                                                  

Administered Medications:                                                                         

22:08 Drug: NS 0.9% 1000 ml Route: IV; Rate: 1 bolus; Site: left antecubital;                 aa9 

22:08 Drug: Zofran (Ondansetron) 4 mg Route: IVP; Site: left antecubital;                     aa9 

22:08 Drug: morphine 4 mg Route: IVP; Infused Over: 4 mins; Site: left antecubital;           aa9 

22:08 Drug: Rocephin (cefTRIAXone) 1 grams Route: IV; Rate: per protocol; Site: left          aa9 

      antecubital;                                                                                

23:17 Drug: levofloxacin 750 mg Volume: 150 ml; Route: IVPB; Infused Over: 90 mins; Site:     aa9 

      left antecubital;                                                                           

                                                                                             

00:50 Follow up: Response: No adverse reaction; IV Status: Completed infusion; IV Intake:     aa9 

      150ml                                                                                       

                                                                                             

23:17 Drug: Pyridium (phenazopyridine) 200 mg Route: PO;                                      aa9 

                                                                                             

00:37 Follow up: Response: No adverse reaction                                                aa9 

                                                                                                  

                                                                                                  

Disposition Summary:                                                                              

22 23:24                                                                                    

Discharge Ordered                                                                                 

      Location: Home                                                                          iván 

      Problem: new                                                                            iván 

      Symptoms: have improved                                                                 iván 

      Condition: Stable                                                                       iván 

      Diagnosis                                                                                   

        - Dysuria                                                                             iván 

        - UTI/ Urinary tract infection, site not specified                                    iván 

        - Hematuria, unspecified                                                              iván 

        - Cystitis, unspecified with hematuria                                                iván 

        - Diverticulosis of intestine, part unspecified, without perforation or abscess       iván 

      without bleeding                                                                            

        - Other cholelithiasis without obstruction                                            iván 

      Followup:                                                                               iván 

        - With: Private Physician                                                                  

        - When: 2 - 3 days                                                                         

        - Reason: Recheck today's complaints, Continuance of care, Re-evaluation by your           

      physician                                                                                   

      Discharge Instructions:                                                                     

        - Discharge Summary Sheet                                                             iván 

        - Diverticulosis                                                                      iván 

        - Dysuria                                                                             iván 

        - Hematuria, Adult                                                                    iván 

        - Urinary Tract Infection, Adult                                                      iván 

        - Cholelithiasis, Easy-to-Read                                                        iván 

        - Urinary Tract Infection, Adult, Easy-to-Read                                        iván 

        - Antibiotic Medicine, Adult                                                          iván 

      Forms:                                                                                      

        - Medication Reconciliation Form                                                      [facility] 

        - Thank You Letter                                                                    [facility] 

        - Antibiotic Education                                                                [facility] 

        - Prescription Opioid Use                                                             [facility] 

      Prescriptions:                                                                              

        - Pyridium 200 mg Oral Tablet                                                              

            - take 1 tablet by ORAL route every 8 hours for 3 days; 9 tablet; Refills: 0,     [facility] 

      Product Selection Permitted                                                                 

        - levofloxacin 500 mg Oral Tablet                                                          

            - take 1 tablet by ORAL route once daily for 7 days; 7 tablet; Refills: 0,        [facility] 

      Product Selection Permitted                                                                 

        - Bactrim -160 mg Oral Tablet                                                        

            - take 1 tablet by ORAL route every 12 hours for 3 days; 6 tablet; Refills: 0,    [facility] 

      Product Selection Permitted                                                                 

Signatures:                                                                                       

Dispatcher [employer]                           Mohit Nelson MD MD cha Baxter, Heather, RN RN                                                      

Lennie Reece RN                       RN   aa9                                                  

                                                                                                  

**************************************************************************************************

## 2022-12-17 NOTE — XMS REPORT
Continuity of Care Document

                          Created on:2022



Patient:VERÓNICA CENTENO

Sex:Female

:1965

External Reference #:[id number]





Demographics







                          Address                   [address] [place] [address]

 

                          Home Phone                [phone]

 

                          Work Phone                [phone]

 

                          Mobile Phone              [phone]

 

                          Email Address             [email or web address]

 

                          Preferred Language        English

 

                          Marital Status            Unknown

 

                          Hindu Affiliation     Unknown

 

                          Race                      Unknown

 

                          Additional Race(s)        Unavailable

 

                          Ethnic Group              Unknown









Author







                          Organization              [facility]

t

 

                          Address                   [address]



                                                    [place] [address]

 

                          Phone                     [phone]









Support







                Name            Relationship    Address         Phone

 

                ZULY CENTENO   Unavailable     [address] [phone]



                                                [place] [id number] 

 

                GARRETT ESCALERA Unavailable     [address]     [phone]



                                                [place] [phone] 

 

                NADER CENTENO     Unavailable     [address] [phone]



                                                [place] [address] 

 

                LORRAINE ESCALERA Unavailable     [address]     [phone]



                                                [place] [phone] 

 

                LORRAINE BUTLER M               [address] [phone]



                                                [place] [phone] 

 

                Zuly Chung Spouse          [address]    [phone]

[phone]



                                                [place] [address] 

 

                ZULY CENTENO X               [address]    Unavailable



                                                [place] [phone] 









Care Team Providers







                    Name                Role                Phone

 

                    KIMANI SANDOVAL Primary Care Physician Unavailable

 

                    WANDA DAN    Attending Clinician Unavailable

 

                    RADIOLOGY           Attending Clinician Unavailable

 

                    Wanda Dan MD Attending Clinician [phone]

 

                    TITI REILLY     Attending Clinician Unavailable

 

                    NAYELI CABRERA      Attending Clinician Unavailable

 

                    WANDA DAN    Admitting Clinician Unavailable









Payers







           Payer Name Policy Type Policy Number Effective Date Expiration Date S

gee

 

           [facility] -            [id number] 2008-10-01 00:00:00          

  



           OUT OF STATE                                             







Problems







       Condition Condition Condition Status Onset  Resolution Last   Treating Co

mments 

Source



       Name   Details Category        Date   Date   Treatment Clinician        



                                                 Date                 

 

       Heartburn Heartburn Disease Active 2020                      Overview: 

Univers



                                   -17                        Formattin ity of



                                   00:00:                      g of this [place]



                                   00                          note   Medical



                                                               might be Branch



                                                               different 



                                                               from the 



                                                               original. 



                                                               Added  



                                                               automatic 



                                                               ally from 



                                                               request 



                                                               for    



                                                               surgery 



                                                               [id number] 

 

       Diverticul Diverticul Disease Active                              U

nivers



       itis   itis                 15                               ity of



                                   00:00:                             [place]



                                   00                                 Medical



                                                                      Branch

 

       Morbid Morbid Disease Active                              Univers



       obesity obesity               915                               ity of



       with body with body               00:00:                             [place]

[place]



       mass index mass index               00                                 Me

dical



       of     of                                                      Branch



       40.0-49.9 40.0-49.9                                                  







Allergies, Adverse Reactions, Alerts







       Allergy Allergy Status Severity Reaction(s) Onset  Inactive Treating Comm

ents 

Source



       Name   Type                        Date   Date   Clinician        

 

       No Known DA     Active U             2018                      [facility]



       Allergie                             2-14                        [place]



       s                                  00:00:                      [facility]



                                          00                          dic



                                                                      [facility]



                                                                      l

 

       No Known DA     Active U             2018                      [facility]



       Allergie                                                     [place]



       s                                  00:00:                      [facility]



                                          00                          dic



                                                                      [facility]ospita



                                                                      l

 

       No Known DA     Active U             2018                      [facility]



       Allergie                                                     [place]



       s                                  00:00:                      [facility]



                                          00                          dic



                                                                      Hospita



                                                                      l

 

       No Known DA     Active U                                   [facility]



       Allergie                                                     [place]



       s                                  00:00:                      [facility]



                                          00                          dic



                                                                      Hospita



                                                                      l

 

       NO KNOWN Drug   Active                                           [facility]



       ALLERGIE Class                                                   ity of



       S                                                              [facility]







Social History







           Social Habit Start Date Stop Date  Quantity   Comments   Source

 

           History [facility]                                             University o

f



           Alcohol Std Drinks                                             [place] 

Medical



                                                                  Branch

 

           History [facility] o

f



           Alcohol Binge                                             [place] [facility]

al



                                                                  Branch

 

           History [facility] o

f



           Alcohol Comment                                             [place] [facility]

ical



                                                                  Branch

 

           Exposure to                       Yes                   University of



           SARS-CoV-2 (event)                                             [facility]



                                                                  Branch

 

           History of tobacco                       Cigarette Smoker            

University of



           use                                                    [facility]



                                                                  Branch

 

           Alcohol intake 2021 Lifetime              University

 of



                      00:00:00   00:00:00   non-drinker            [place] [facility]



                                            (finding)             Branch

 

           History SDOH 2019-11-15 2019-11-15 1                     University o

f



           Alcohol Frequency 00:00:00   00:00:00                         [facility]



                                                                  Branch

 

           Cigarettes smoked 2019-09-15 2019-09-15                       Univers

ity of



           current (pack per 00:00:00   00:00:00                         [facility]



           day) - Reported                                             Branch

 

           Tobacco use and 2019-09-15 2019-09-15 Never used            Universit

y of



           exposure   00:00:00   00:00:00                         [facility]

 

           Sex Assigned At 1965                       Universit

y of



           Birth      00:00:00   00:00:00                         [facility]









                Smoking Status  Start Date      Stop Date       Source

 

                Current every day smoker 2019-09-15 00:00:00                 [facility]







Medications







       Ordered Filled Start  Stop   Current Ordering Indication Dosage Frequency

 Signature

                    Comments            Components          Source



     Medication Medication Date Date Medication? Clinician                (SIG) 

          



     Name Name                                                   

 

     PANTOPRAZOL            Yes       [id number]           TAKE 1           

Univers



     E 20 mg EC      9-07                               TABLET BY           ity 

of



     tablet      00:00:                               MOUTH           [place]



               00                                 EVERY DAY           Medical



                                                                 Branch

 

     PANTOPRAZOL            Yes       [id number]           TAKE 1           

Univers



     E 20 mg EC      9-07                               TABLET BY           ity 

of



     tablet      00:00:                               MOUTH           [place]



               00                                 EVERY DAY           Medical



                                                                 Branch

 

     escitalopra      2020      Yes            10mg      Take 10 mg           

Univers



     m oxalate      2-07                               by mouth           ity of



     10 mg      00:00:                               every           [place]



     tablet      00                                 morning.           Medical



                                                                 Branch

 

     terbinafine      2020      Yes            250mg      Take 250           U

nivers



      mg      2-07                               mg by           ity of



     tablet      00:00:                               mouth           [place]



               00                                 daily.           Medical



                                                                 Branch

 

     escitalopra      2020      Yes            10mg      Take 10 mg           

Univers



     m oxalate      2-07                               by mouth           ity of



     10 mg      00:00:                               every           [place]



     tablet      00                                 morning.           Medical



                                                                 Branch

 

     terbinafine      2020      Yes            250mg      Take 250           U

nivers



      mg      2-07                               mg by           ity of



     tablet      00:00:                               mouth           [place]



               00                                 daily.           Medical



                                                                 Branch

 

     QUEtiapine            Yes            6.25mg      Take 6.25           

Univers



     25 mg      9-17                               mg by           ity of



     tablet      00:00:                               mouth           [place]



               00                                 daily.           Medical



                                                                 Branch

 

     QUEtiapine            Yes            6.25mg      Take 6.25           

Univers



     25 mg      9-17                               mg by           ity of



     tablet      00:00:                               mouth           [place]



               00                                 daily.           Medical



                                                                 Branch







Vital Signs







             Vital Name   Observation Time Observation Value Comments     Source

 

             Systolic blood 2021 20:42:00 116 mm[Hg]                Univer

sity of



             pressure                                            [facility]

 

             Diastolic blood 2021 20:42:00 68 mm[Hg]                 [facility]

 

             Heart rate   2021 20:42:00 85 /min                   [facility]

[facility]

 

             Body temperature 2021 20:42:00 36.28 Aleida                 [facility]

 

             Respiratory rate 2021 20:42:00 16 /min                   [facility]

 

             Body height  2021 20:42:00 154.9 cm                  [facility]

 

             Body weight  2021 20:42:00 99.61 kg                  [facility]

 

             BMI          2021 20:42:00 41.49 kg/m2               [facility]

 

             Oxygen saturation in 2021 20:42:00 96 /min                   

[facility]



             Arterial blood by                                        [place] [facility]

walt



             Pulse oximetry                                        Branch







Procedures







                Procedure       Date / Time Performed Performing Clinician Sour

e

 

                US ABDOMEN LIMITED 2022-01-10 19:17:30 Jozef Marcelo

[facility]

 

                CONSENT/REFUSAL FOR 2022-01-10 18:35:32 Doctor [facility]



                DIAGNOSIS AND                   Name            [facility]



                TREATMENT                                       

 

                ASSIGNMENT OF BENEFITS 2022-01-10 18:35:18 Doctor Unassigned, No

 [facility]







Encounters







        Start   End     Encounter Admission Attending Care    Care    Encounter 

Source



        Date/Time Date/Time Type    Type    Clinicians Facility Department ID   

   

 

        2021-10-30         Emergency                 [facility]    [id number] 

Univers



        14:29:53                                                         [facility]

 

        2021-10-30         Outpatient R       [facility]    [facility]     [id number]

[id number] Univers



        06:29:11                         WANDA proctor [facility]

 

        2022 Outpatient R       RADIOLOGY [facility]    [id number]

[id number] Univers



        00:00:00 00:00:00                                                 [facility]

 

        2022-01-10 2022-01-10 Outpatient R       LISS [facility]    [id number]

[id number] Univers



        12:35:22 23:59:00                 WANDA proctor [facility]

 

        2022-01-10 2022-01-10 [facility]    [id number]

[id number] Univers



        12:35:22 23:59:00 Encounter         Wanda WATSON [id number]        

 [facility] [id number]         [facility]  [phone]         Medi

walt



                                                        806             Branch

 

        2021 Outpatient R       LISS [facility]    [id number]

[id number] Univers



        14:45:00 15:17:57                 WANDA                          [facility]

 

        2021 [facility]    [id number]

[id number] Univers



        14:45:00 15:17:57 Visit           Wanda WATSON [id number]         i

ty of



                                                [place] [id number]         [place]

[place]



                                                [place] [phone]         Me

dical



                                                [address]                 

 

        2021 Refill          [facility]    [id number]

[id number] Univers



        00:00:00 00:00:00                 Wanda Watson [id number]         i

ty of



                                                [place] [id number]         [place]

s



                                                [place] [phone]         Me

dic[address]                 

 

        2021 Outpatient R       TOMMIE [facility]    [id number]

[id number] Univers



        13:00:00 13:00:00                 [facility]

 

        2021-06-15 2021-06-15 Outpatient R       LISS [facility]    [id number]

[id number] Univers



        15:30:00 15:30:00                 WANDA                          [facility]

 

        2021-01-15 2021-01-15 Outpatient R       RADIOLOGY [facility]    [id number]

[id number] Univers



        00:00:00 00:00:00                                                 [facility]

 

        2020 Outpatient R       LISS [facility]    [id number]

[id number] Univers



        09:30:00 09:30:00                 WANDA                          [facility]

 

        2020 Outpatient R       LISS [facility]    [id number]

[id number] Univers



        10:30:00 10:30:00                 WANDA                          [facility]

 

        2020 Outpatient R       LISS [facility]    [id number]

[id number] Univers



        13:45:00 13:45:00                 WANDA                          [facility]

 

        2020 Outpatient R       LISS [facility]    [id number]

[id number] Univers



        09:30:00 09:30:00                 WANDA                          [facility]

 

        2020 Outpatient R       DEBORAH  [facility]    [id number]

[id number] Univers



        10:40:00 10:40:00                 NAYELI proctor [facility]







Results

This patient has no known results.

## 2022-12-18 VITALS — SYSTOLIC BLOOD PRESSURE: 120 MMHG | OXYGEN SATURATION: 100 % | DIASTOLIC BLOOD PRESSURE: 67 MMHG

## 2022-12-18 VITALS — TEMPERATURE: 98.4 F

## 2022-12-19 NOTE — RAD REPORT
EXAM DESCRIPTION:  CT - Abdomen   Pelvis W Contrast - 12/18/2022 6:06 am

 

CLINICAL HISTORY:  Flank pain, kidney stone suspected

 

COMPARISON:  None Available.

 

TECHNIQUE:  CT of the abdomen and pelvis performed following IV administration of   iodinated contras
t. This exam was performed according to our departmental dose-optimization program, which includes au
tomated exposure control, adjustment of the mA and/or kV according to patient size and/or use of iter
ative reconstruction technique.

 

FINDINGS:  Lung Bases: Minimal dependent atelectasis.

Bones: Mild endplate spondylosis and facet arthropathy.

Abdomen:

Liver: Hepatomegaly.

Gallbladder: Calcified gallstone.

Spleen, Pancreas, and Adrenal Glands:   The spleen, pancreas, and adrenal glands are unremarkable.

Kidneys:   No hydronephrosis or obstructing calculus.

Vasculature: Aortoiliac atherosclerosis. IVC is unremarkable.   The portal vein is patent. The proxim
al visceral and renal arteries are patent.

Stomach:   Small hiatal hernia.

Other:   No free intraperitoneal air.   No free fluid or lymphadenopathy.

Pelvis:

Bladder:   Wall thickening of the urinary bladder.

Bowel:   No dilated loops of large or small bowel. Scattered diverticula colon.

Appendix:   Normal appendix.

Pelvis: Prior hysterectomy.

 

IMPRESSION:  1.   Wall thickening of the urinary bladder. This could be seen with cystitis.

2.   Hepatomegaly.

3.   Cholelithiasis.

4.   Diverticulosis without evidence of acute diverticulitis.

 

Electronically signed by:   Kirby Swain   12/17/2022 11:47 PM CST Workstation: [id number]

 

 

 

Due to temporary technical issues with the PACS/Fluency reporting system, reports are being signed by
 the in house radiologists without review as a courtesy to insure prompt reporting. The interpreting 
radiologist is fully responsible for the content of the report.

## 2023-07-14 ENCOUNTER — HOSPITAL ENCOUNTER (EMERGENCY)
Dept: HOSPITAL 97 - ER | Age: 58
Discharge: HOME | End: 2023-07-14
Payer: COMMERCIAL

## 2023-07-14 VITALS — OXYGEN SATURATION: 98 % | DIASTOLIC BLOOD PRESSURE: 51 MMHG | SYSTOLIC BLOOD PRESSURE: 112 MMHG

## 2023-07-14 VITALS — TEMPERATURE: 98.4 F

## 2023-07-14 DIAGNOSIS — J03.90: Primary | ICD-10-CM

## 2023-07-14 DIAGNOSIS — F17.210: ICD-10-CM

## 2023-07-14 PROCEDURE — 99284 EMERGENCY DEPT VISIT MOD MDM: CPT

## 2023-07-14 PROCEDURE — 96372 THER/PROPH/DIAG INJ SC/IM: CPT

## 2023-07-14 NOTE — EDPHYS
Physician Documentation                                                                           

 [facility]                                                                 

Name: Emma Pizarro                                                                                 

Age: 58 yrs                                                                                       

Sex: Female                                                                                       

: 1965                                                                                   

MRN: [id number]                                                                                   

Arrival Date: 2023                                                                          

Time: 06:20                                                                                       

Account#: [id number]                                                                            

Bed 19                                                                                            

Private MD:                                                                                       

ED Physician Pj Hudson                                                                         

HPI:                                                                                              

                                                                                             

06:42 This 58 yrs old  Female presents to ER via Ambulatory with complaints of Sore   rn  

      Throat.                                                                                     

06:42 The patient presents with sore throat. The patient describes throat pain as raw,        rn  

      scratchy. Onset: The symptoms/episode began/occurred 3 day(s) ago. Severity of              

      symptoms: At their worst the symptoms were moderate, in the emergency department the        

      symptoms are unchanged. Modifying factors: The symptoms are alleviated by nothing, the      

      symptoms are aggravated by swallowing. The patient has not experienced similar symptoms     

      in the past. The patient has not recently seen a physician. Pt reports a few days of        

      sore throat, seen by her physician, told has strep, put on zithromax, doesn't feel          

      better. Reports increased pain and subjective swelling and neck hurting on right side.      

      No fever. NO difficulty breathing. NO change in voice..                                     

                                                                                                  

Historical:                                                                                       

- Home Meds:                                                                                      

06:34 Seroquel Oral [Active];                                                                 kd3 

- PSHx:                                                                                           

06:34 Total abdominal hysterectomy;                                                           kd3 

                                                                                                  

- Immunization history:: Adult Immunizations up to date, Client reports having NOT                

  received the Covid vaccine.                                                                     

- Social history:: Smoking status: Patient reports the use of cigarette tobacco                   

  products, smokes one-half pack cigarettes per day.                                              

- Family history:: not pertinent.                                                                 

- Hospitalizations: : No recent hospitalization is reported.                                      

                                                                                                  

                                                                                                  

ROS:                                                                                              

06:42 Constitutional: Negative for fever, chills, and weight loss, ENT: + sore throat Neck: + rn  

      right anterior neck pain Cardiovascular: Negative for chest pain, palpitations, and         

      edema, Respiratory: Negative for shortness of breath, cough, wheezing, and pleuritic        

      chest pain, Abdomen/GI: Negative for abdominal pain, nausea, vomiting, diarrhea, and        

      constipation, MS/Extremity: Negative for injury and deformity, Skin: Negative for           

      injury, rash, and discoloration, Neuro: + headache                                          

                                                                                                  

Exam:                                                                                             

06:42 Constitutional:  This is a well developed, well nourished patient who is awake, alert,  rn  

      and in no acute distress. Head/Face:  Normocephalic, atraumatic. ENT:  + tonsillar          

      hypertrophy with exudate, uvula midline, no PTA, no stridor, MMM Neck:  + tender            

      bialteral cervical LAD, no crepitus, no expansile mass Neuro:  Awake and alert, GCS 15      

                                                                                                  

Vital Signs:                                                                                      

06:31  / 49; Pulse 84; Resp 16; Temp 98.4(O); Pulse Ox 97% on R/A; Weight 96.16 kg;     kd3 

      Height 5 ft. 1 in. ;                                                                        

06:54  / 51; Pulse 82; Resp 16; Pulse Ox 98% on R/A;                                    kd3 

06:31 Body Mass Index 40.06 (96.16 kg, 154.94 cm)                                             kd3 

                                                                                                  

MDM:                                                                                              

06:41 Patient medically screened.                                                             rn  

06:42 Differential diagnosis: group A strep tonsillitis, pharyngitis, tonsillitis, viral      rn  

      syndrome. Data reviewed: vital signs, nurses notes, and as a result, I will discharge       

      patient. Counseling: I had a detailed discussion with the patient and/or guardian           

      regarding: the historical points, exam findings, and any diagnostic results supporting      

      the discharge/admit diagnosis, the need for outpatient follow up, to return to the          

      emergency department if symptoms worsen or persist or if there are any questions or         

      concerns that arise at home. ED course: No evidence of oral abscess, no crepitus or         

      fullness of neck, + tender lymphadenopathy, no indication for surgery or emergent           

      imaging at this time, not allergic to PCNs, will give rocephin and steroids and dc with     

      augmentin. Return precautions given and understood. .                                       

                                                                                                  

Administered Medications:                                                                         

06:54 Drug: Dexamethasone IM 10 mg Route: IM; Site: right gluteus;                            kd3 

06:59 Follow up: Response: No adverse reaction                                                kd3 

06:54 Drug: Rocephin (cefTRIAXone) IM 1 grams Route: IM; Site: right gluteus;                 kd3 

06:59 Follow up: Response: No adverse reaction                                                kd3 

                                                                                                  

                                                                                                  

Disposition Summary:                                                                              

23 06:51                                                                                    

Discharge Ordered                                                                                 

      Location: Home                                                                          rn  

      Problem: new                                                                            rn  

      Symptoms: have improved                                                                 rn  

      Condition: Stable                                                                       rn  

      Diagnosis                                                                                   

        - Acute tonsillitis, unspecified                                                      rn  

      Followup:                                                                               rn  

        - With: Private Physician                                                                  

        - When: As needed                                                                          

        - Reason: Recheck today's complaints, Re-evaluation by your physician                      

      Discharge Instructions:                                                                     

        - Discharge Summary Sheet                                                             rn  

        - Tonsillitis                                                                         rn  

      Forms:                                                                                      

        - Medication Reconciliation Form                                                      rn  

        - Thank You Letter                                                                    rn  

        - Antibiotic Education                                                                rn  

        - Prescription Opioid Use                                                             rn  

        - Patient Portal Instructions                                                         rn  

      Prescriptions:                                                                              

        - Augmentin 875-125 mg Oral Tablet                                                         

            - take 1 tablet by ORAL route every 12 hours for 10 days; 20 tablet; Refills: 0,  rn  

      Product Selection Permitted                                                                 

Signatures:                                                                                       

Dispatcher [employer]                           Pj Swan MD MD rn Doucette, Kyli, RN                      RN   kd3                                                  

                                                                                                  

**************************************************************************************************

## 2023-07-14 NOTE — XMS REPORT
Continuity of Care Document

                            Created on:2023



Patient:VERÓNICA CENTENO

Sex:Female

:1965

External Reference #:[id number]





Demographics







                          Address                   [address] [place] [address]

 

                          Home Phone                [phone]

 

                          Work Phone                [phone]

 

                          Mobile Phone              [phone]

 

                          Email Address             [email or web address]

 

                          Preferred Language        English

 

                          Marital Status            Unknown

 

                          Caodaism Affiliation     Unknown

 

                          Race                      Unknown

 

                          Additional Race(s)        Unavailable

 

                          Ethnic Group              Unknown









Author







                          Organization              [facility]

t

 

                          Address                   [address] [place] [address] [address] [place] [address]

 

                          Phone                     [phone]









Support







                Name            Relationship    Address         Phone

 

                ZULY CENTENO   Unavailable     [address] [phone]



                                                [place] [phone] 

 

                GARRETT ESCALERA Unavailable     [address]     [phone]



                                                [place] [phone] 

 

                NADER CENTENO     Unavailable     [address] [phone]



                                                [place] [phone] 

 

                LORRAINE ESCALERA Unavailable     [address]     [phone]



                                                [place] [phone] 

 

                LORRAINE BUTLER M               [address] [phone]



                                                [place] [phone] 

 

                Zuly Chung Spouse          [address]    [phone]

[phone]



                                                [place] [address] 

 

                ZULY CENTENO X               [address]    Unavailable



                                                [place] [phone] 









Care Team Providers







                    Name                Role                Phone

 

                    KIMANI SANDOVAL Primary Care Physician Unavailable

 

                    WANDA DAN    Attending Clinician Unavailable

 

                    RADIOLOGY           Attending Clinician Unavailable

 

                    Radiology           Attending Clinician Unavailable

 

                    Doctor Unassigned, No Name Attending Clinician Unavailable

 

                    Wanda Dan MD Attending Clinician [phone]

 

                    TITI REILLY     Attending Clinician Unavailable

 

                    NAYELI CABRERA      Attending Clinician Unavailable

 

                    WANDA DAN    Admitting Clinician Unavailable

 

                    KIMANI SANDOVAL Admitting Clinician Unavailable









Payers







           Payer Name Policy Type Policy Number Effective Date Expiration Date S

gee

 

           [facility] -            [id number] 2008-10-01 00:00:00          

  



           OUT OF STATE                                             







Problems







       Condition Condition Condition Status Onset  Resolution Last   Treating Co

mments 

Source



       Name   Details Category        Date   Date   Treatment Clinician        



                                                 Date                 

 

       Heartburn Heartburn Disease Active 2020                      Overview: 

Univers



                                   1-17                        Formattin ity of



                                   00:00:                      g of this [place]



                                   00                          note   Medical



                                                               might be Branch



                                                               different 



                                                               from the 



                                                               original. 



                                                               Added  



                                                               automatic 



                                                               ally from 



                                                               request 



                                                               for    



                                                               surgery 



                                                               [id number] 

 

       Diverticul Diverticul Disease Active                              U

nivers



       itis   itis                 9-15                               ity of



                                   00:00:                             [place]



                                   00                                 Medical



                                                                      Branch

 

       Morbid Morbid Disease Active                              Univers



       obesity obesity               9-15                               ity of



       with body with body               00:00:                             [place]

[place]



       mass index mass index               00                                 Me

dical



       of     of                                                      Branch



       40.0-49.9 40.0-49.9                                                  







Allergies, Adverse Reactions, Alerts







       Allergy Allergy Status Severity Reaction(s) Onset  Inactive Treating Comm

ents 

Source



       Name   Type                        Date   Date   Clinician        

 

       No Known DA     Active U             2018                      [facility]



       Allergie                             2-14                        [place]



       s                                  00:00:                      [facility]



                                          00                          dic



                                                                      [facility]



                                                                      l

 

       No Known DA     Active U             2018                      [facility]



       Allergie                             1-05                        [place]



       s                                  00:00:                      [facility]



                                          00                          dic



                                                                      [facility]ospita



                                                                      l

 

       No Known DA     Active U             2018                      [facility]



       Allergie                             1-02                        [place]



       s                                  00:00:                      [facility]



                                          00                          dic



                                                                      Hospita



                                                                      l

 

       No Known DA     Active U                                   [facility]



       Allergie                             7-06                        [place]



       s                                  00:00:                      [facility]



                                          00                          dic



                                                                      Hospita



                                                                      l

 

       NO KNOWN Drug   Active                                           Univers



       ALLERGIE Class                                                   ity of



       S                                                              [facility]







Social History







           Social Habit Start Date Stop Date  Quantity   Comments   Source

 

           History [facility] o

f



           Alcohol Std Drinks                                             [place] 

Medical



                                                                  Branch

 

           History [facility] o

f



           Alcohol Binge                                             [place] [facility]

al



                                                                  Branch

 

           History [facility] o

f



           Alcohol Comment                                             [place] [facility]

ical



                                                                  Branch

 

           Exposure to                       Yes                   University of



           SARS-CoV-2 (event)                                             [facility]



                                                                  Branch

 

           History of tobacco                       Cigarette Smoker            

University of



           use                                                    [facility]



                                                                  Branch

 

           Alcohol intake 2021 Lifetime              University

 of



                      00:00:00   00:00:00   non-drinker            [facility]



                                            (finding)             Branch

 

           Cigarettes smoked 2019-11-15 2019-11-15                       Univers

ity of



           current (pack per 00:00:00   00:00:00                         [place] M

edical



           day) - Reported                                             Branch

 

           Tobacco use and 2019-11-15 2019-11-15 Smokeless             Universit

y of



           exposure   00:00:00   00:00:00   tobacco non-user            [place] Me

dical



                                                                  Branch

 

           History SDOH 2019-11-15 2019-11-15 1                     University o

f



           Alcohol Frequency 00:00:00   00:00:00                         [facility]

 

           Sex Assigned At 1965                       Universit

y of



           Birth      00:00:00   00:00:00                         [facility]









                Smoking Status  Start Date      Stop Date       Source

 

                Smokes tobacco daily 2019-11-15 00:00:00                 Univers

ity of [place] [facility]







Medications







       Ordered Filled Start  Stop   Current Ordering Indication Dosage Frequency

 Signature

                    Comments            Components          Source



     Medication Medication Date Date Medication? Clinician                (SIG) 

          



     Name Name                                                   

 

     PANTOPRAZOL            Yes       [id number]           TAKE 1           

Univers



     E 20 mg EC      9-07                               TABLET BY           ity 

of



     tablet      00:00:                               MOUTH           [place]



               00                                 EVERY DAY           Medical



                                                                 Branch

 

     PANTOPRAZOL            Yes       [id number]           TAKE 1           

Univers



     E 20 mg EC      9-07                               TABLET BY           ity 

of



     tablet      00:00:                               MOUTH           [place]



               00                                 EVERY DAY           Medical



                                                                 Branch

 

     PANTOPRAZOL            Yes       [id number]           TAKE 1           

Univers



     E 20 mg EC      9-07                               TABLET BY           ity 

of



     tablet      00:00:                               MOUTH           [place]



               00                                 EVERY DAY           Medical



                                                                 Branch

 

     PANTOPRAZOL            Yes       [id number]           TAKE 1           

Univers



     E 20 mg EC      9-07                               TABLET BY           ity 

of



     tablet      00:00:                               MOUTH           [place]



               00                                 EVERY DAY           Medical



                                                                 Branch

 

     escitalopra      -      Yes            10mg      Take 10 mg           

Univers



     m oxalate      2-07                               by mouth           ity of



     10 mg      00:00:                               every           [place]



     tablet      00                                 morning.           Medical



                                                                 Branch

 

     terbinafine      -      Yes            250mg      Take 250           U

nivers



      mg      2-07                               mg by           ity of



     tablet      00:00:                               mouth           [place]



               00                                 daily.           Medical



                                                                 Branch

 

     escitalopra      -      Yes            10mg      Take 10 mg           

Univers



     m oxalate      2-07                               by mouth           ity of



     10 mg      00:00:                               every           [place]



     tablet      00                                 morning.           Medical



                                                                 Branch

 

     terbinafine      -      Yes            250mg      Take 250           U

nivers



      mg      2-07                               mg by           ity of



     tablet      00:00:                               mouth           [place]



               00                                 daily.           Medical



                                                                 Branch

 

     escitalopra      -      Yes            10mg      Take 10 mg           

Univers



     m oxalate      2-07                               by mouth           ity of



     10 mg      00:00:                               every           [place]



     tablet      00                                 morning.           Medical



                                                                 Branch

 

     terbinafine      -      Yes            250mg      Take 250           U

nivers



      mg      2-07                               mg by           ity of



     tablet      00:00:                               mouth           [place]



               00                                 daily.           Medical



                                                                 Branch

 

     escitalopra      -      Yes            10mg      Take 10 mg           

Univers



     m oxalate      2-07                               by mouth           ity of



     10 mg      00:00:                               every           [place]



     tablet      00                                 morning.           Medical



                                                                 Branch

 

     terbinafine      2020      Yes            250mg      Take 250           U

nivers



      mg      2-07                               mg by           ity of



     tablet      00:00:                               mouth           [place]



               00                                 daily.           Medical



                                                                 Branch

 

     QUEtiapine            Yes            6.25mg      Take 6.25           

Univers



     25 mg      9-17                               mg by           ity of



     tablet      00:00:                               mouth           [place]



               00                                 daily.           Medical



                                                                 Branch

 

     QUEtiapine            Yes            6.25mg      Take 6.25           

Univers



     25 mg      9-17                               mg by           ity of



     tablet      00:00:                               mouth           [place]



               00                                 daily.           Medical



                                                                 Branch

 

     QUEtiapine            Yes            6.25mg      Take 6.25           

Univers



     25 mg      9-17                               mg by           ity of



     tablet      00:00:                               mouth           [place]



               00                                 daily.           Medical



                                                                 Branch

 

     QUEtiapine            Yes            6.25mg      Take 6.25           

Univers



     25 mg      9-17                               mg by           ity of



     tablet      00:00:                               mouth           [place]



               00                                 daily.           Medical



                                                                 Branch







Vital Signs







             Vital Name   Observation Time Observation Value Comments     Source

 

             Systolic blood 2021 20:42:00 116 mm[Hg]                Univer

sity of



             pressure                                            [facility]

 

             Diastolic blood 2021 20:42:00 68 mm[Hg]                 [facility]

 

             Heart rate   2021 20:42:00 85 /min                   [facility]

 

             Body temperature 2021 20:42:00 36.28 Aleida                 [facility]

 

             Respiratory rate 2021 20:42:00 16 /min                   [facility]

 

             Body height  2021 20:42:00 154.9 cm                  [facility]

 

             Body weight  2021 20:42:00 99.61 kg                  [facility]

 

             BMI          2021 20:42:00 41.49 kg/m2               [facility]

 

             Oxygen saturation in 2021 20:42:00 96 /min                   

[facility] blood by                                        [place] [facility]

walt



             Pulse oximetry                                        Branch







Procedures







                Procedure       Date / Time Performed Performing Clinician Sour

e

 

                ASSIGNMENT OF BENEFITS 2022 16:43:36 Doctor Unassigned, No

 [facility]



                                                Name            Medical Branch

 

                US ABDOMEN LIMITED 2022-01-10 19:17:30 [facility]

 

                CONSENT/REFUSAL FOR 2022-01-10 18:35:32 Doctor Unassigned, No Un

[facility]



                DIAGNOSIS AND                   Name            [facility] Branch



                TREATMENT                                       

 

                ASSIGNMENT OF BENEFITS 2022-01-10 18:35:18 Doctor Unassigned, No

 [facility]







Encounters







        Start   End     Encounter Admission Attending Care    Care    Encounter 

Source



        Date/Time Date/Time Type    Type    Clinicians Facility Department ID   

   

 

        2021-10-30         Emergency                 [facility]    [id number] 

Univers



        14:29:53                                                         ity [facility] [place]

 

        2021-10-30         [facility]       [facility]     [id number]

[id number] Univers



        06:29:11                         WANDA                          itmalgorzata [facility] [place]

 

        2022 Outpatient R       RADIOLOGY [facility]    [id number]

[id number] Univers



        10:44:21 23:59:00                                                 ity [facility] [place]

 

        2022 Hospital         Radiology [facility]    [id number]

[id number] Univers



        10:44:21 23:59:00 Encounter                 JUNE [id number]        

 ity of



                                                [place] [id number]         [place]

s



                                                [place]  [phone]         Medi

walt



                                                        [address]             Branch

 

        2022 Orders          Doctor  TAI[facility]    [id number]

[id number] Univers



        00:00:00 00:00:00 Only            Unassigned, AMI   350.1.13.10       

  ity of



                                        [facility] 4.2.7.2.686         [place]

as



                                                        [phone]         Medi

walt



                                                        009             Branch

 

        2022-01-10 2022-01-10 [facility]    [id number]

[id number] Univers



        12:35:22 23:59:00                 WANDA                          itmalgorzata [facility] [place]

 

        2022-01-10 [facility]    [id number]

[id number] Univers



        12:35:22 23:59:00 Encounter         Wanda  JUNE 350.1.13.10        

 ity of



                                                [place] 4.2.7.2.686         [place]

s



                                                [place]  [phone]         Medi

walt



                                                        [address]             Branch

 

        [facility]21 [facility]       [facility]    [id number]

[id number] Univers



        14:45:00 15:17:57                 WANDA proctor [facility] [place]

 

        2021 [facility]          [facility]    [id number]

[id number] Univers



        14:45:00 15:17:57 Visit           Wanda WATSON 350.1.13.10         i

ty of



                                                [place] [id number]         [place]

s



                                                [place] [phone]         Me

dical



                                                [address]                 

 

        2021 Refill          [facility]    [id number]

[id number] Univers



        00:00:00 00:00:00                 Wanda Watson 350.1.13.10         i

ty of



                                                [place] [id number]         [place]

s



                                                [place] [phone]         [address]                 

 

        2021 Outpatient R       TOMMIE [facility]    [id number]

[id number] Univers



        13:00:00 13:00:00                 [facility] [place]

 

        2021-06-15 2021-06-15 Outpatient R       SY [facility]    [id number]

[id number] Univers



        15:30:00 15:30:00                 WANDA                          [facility]

 

        2021-01-15 2021-01-15 Outpatient R       RADIOLOGY [facility]    [id number]

[id number] Univers



        00:00:00 00:00:00                                                 hitesh [facility] [place]

 

        2020 Outpatient R       SY [facility]    [id number]

[id number] Univers



        09:30:00 09:30:00                 WANDA                          malgorzata [facility] [place]

 

        2020 Outpatient R       SY [facility]    [id number]

[id number] Univers



        10:30:00 10:30:00                 WANDA                          malgorzata [facility] [place]

 

        2020 Outpatient R       [facility] System    [id number]

[id number] [facility]



        13:45:00 13:45:00                 WANDA proctor [facility]

 

        2020 Outpatient R       SY [facility]    [id number]

[id number] [facility]



        09:30:00 09:30:00                 WANDA proctor [facility]

 

        2020 Outpatient R       DEBORAH  [facility]    [id number]

[id number] [facility]



        10:40:00 10:40:00                 NAYELI proctor of



                                                                        [facility]







Results

This patient has no known results.

## 2023-07-14 NOTE — ER
Nurse's Notes                                                                                     

 [facility]                                                                 

Name: Emma Pizarro                                                                                 

Age: 58 yrs                                                                                       

Sex: Female                                                                                       

: 1965                                                                                   

MRN: [id number]                                                                                   

Arrival Date: 2023                                                                          

Time: 06:20                                                                                       

Account#: [id number]                                                                            

Bed 19                                                                                            

Private MD:                                                                                       

Diagnosis: Acute tonsillitis, unspecified                                                         

                                                                                                  

Presentation:                                                                                     

                                                                                             

06:31 Chief complaint: Patient states: I went to the doctor on Wednesday with a headache and  kd3 

      a sore throat and they sent me home with zithromax. but today I feel like the right         

      side of my neck and ear are swollen and there are white patches on my tonsils.              

      Coronavirus screen: Vaccine status: Patient reports being unvaccinated. Ebola Screen:       

      No symptoms or risks identified at this time. Initial Sepsis Screen: Does the patient       

      meet any 2 criteria? No. Patient's initial sepsis screen is negative. Does the patient      

      have a suspected source of infection? No. Patient's initial sepsis screen is negative.      

      Risk Assessment: Do you want to hurt yourself or someone else? Patient reports no           

      desire to harm self or others. Onset of symptoms was 2023.                         

06:31 Method Of Arrival: Ambulatory                                                           kd3 

06:31 Acuity: REZA 4                                                                           kd3 

                                                                                                  

Triage Assessment:                                                                                

06:34 General: Appears uncomfortable, Behavior is calm, cooperative. Pain: Complains of pain  kd3 

      in left aspect of posterior pharynx and right aspect of posterior pharynx. EENT: Throat     

      is reddened.                                                                                

                                                                                                  

Historical:                                                                                       

- Home Meds:                                                                                      

06:34 Seroquel Oral [Active];                                                                 kd3 

- PSHx:                                                                                           

06:34 Total abdominal hysterectomy;                                                           kd3 

                                                                                                  

- Immunization history:: Adult Immunizations up to date, Client reports having NOT                

  received the Covid vaccine.                                                                     

- Social history:: Smoking status: Patient reports the use of cigarette tobacco                   

  products, smokes one-half pack cigarettes per day.                                              

- Family history:: not pertinent.                                                                 

- Hospitalizations: : No recent hospitalization is reported.                                      

                                                                                                  

                                                                                                  

Screenin:55 [facility] ED Fall Risk Assessment (Adult) History of falling in the last 3 months,       kd3 

      including since admission No falls in past 3 months (0 pts) Confusion or Disorientation     

      No (0 pts) Intoxicated or Sedated No (0 pts) Impaired Gait No (0 pts) Mobility Assist       

      Device Used No (0 pt) Altered Elimination No (0 pt) Score/Fall Risk Level 0 - 2 = Low       

      Risk Maintained a safe environment. Abuse screen: Denies threats or abuse. Denies           

      injuries from another. Nutritional screening: No deficits noted. Tuberculosis               

      screening: No symptoms or risk factors identified.                                          

                                                                                                  

Assessment:                                                                                       

06:55 Respiratory: Airway is patent Respiratory effort is even, unlabored, Breath sounds are  kd3 

      clear bilaterally.                                                                          

                                                                                                  

Vital Signs:                                                                                      

06:31  / 49; Pulse 84; Resp 16; Temp 98.4(O); Pulse Ox 97% on R/A; Weight 96.16 kg;     kd3 

      Height 5 ft. 1 in. ;                                                                        

06:54  / 51; Pulse 82; Resp 16; Pulse Ox 98% on R/A;                                    kd3 

06:31 Body Mass Index 40.06 (96.16 kg, 154.94 cm)                                             kd3 

                                                                                                  

ED Course:                                                                                        

06:23 Patient arrived in ED.                                                                  ag3 

06:31 Tiffanie Peck, RN is Primary Nurse.                                                    kd3 

06:34 Triage completed.                                                                       kd3 

06:35 Arm band placed on right wrist.                                                         kd3 

06:39 Strep Sent.                                                                             bc6 

06:39 Flu Sent.                                                                               bc6 

06:39 COVID-19 SARS RT PCR Sent.                                                              bc6 

06:41 Pj Hudson MD is Attending Physician.                                                rn  

06:55 Patient has correct armband on for positive identification. Provided Education on: .    kd3 

06:55 No provider procedures requiring assistance completed. Patient did not have IV access   kd3 

      during this emergency room visit.                                                           

                                                                                                  

Administered Medications:                                                                         

06:54 Drug: Dexamethasone IM 10 mg Route: IM; Site: right gluteus;                            kd3 

06:59 Follow up: Response: No adverse reaction                                                kd3 

06:54 Drug: Rocephin (cefTRIAXone) IM 1 grams Route: IM; Site: right gluteus;                 kd3 

06:59 Follow up: Response: No adverse reaction                                                kd3 

                                                                                                  

                                                                                                  

Medication:                                                                                       

06:55 VIS not applicable for this client.                                                     kd3 

                                                                                                  

Outcome:                                                                                          

06:51 Discharge ordered by MD.                                                                rn  

06:55 Discharged to home ambulatory.                                                          kd3 

06:55 Condition: stable                                                                           

06:55 Discharge instructions given to patient, Instructed on discharge instructions, follow       

      up and referral plans. Demonstrated understanding of instructions, follow-up care.          

06:56 Discharge instructions given to patient, Instructed on discharge instructions, follow   kd3 

      up and referral plans. medication usage, Demonstrated understanding of instructions,        

      follow-up care, medications, Prescriptions given X 1.                                       

06:59 Patient left the ED.                                                                    kd3 

                                                                                                  

Signatures:                                                                                       

Pj Hudson MD MD rn Gomez, Alice                                 3                                                  

Tiffanie Peck RN                      RN   kd3                                                  

Isela Delgado                           bc6                                                  

                                                                                                  

**************************************************************************************************